# Patient Record
Sex: FEMALE | Race: WHITE | Employment: FULL TIME | URBAN - METROPOLITAN AREA
[De-identification: names, ages, dates, MRNs, and addresses within clinical notes are randomized per-mention and may not be internally consistent; named-entity substitution may affect disease eponyms.]

---

## 2018-04-18 PROCEDURE — 88175 CYTOPATH C/V AUTO FLUID REDO: CPT | Performed by: FAMILY MEDICINE

## 2018-04-18 PROCEDURE — 87624 HPV HI-RISK TYP POOLED RSLT: CPT | Performed by: FAMILY MEDICINE

## 2018-04-18 PROCEDURE — 87660 TRICHOMONAS VAGIN DIR PROBE: CPT | Performed by: FAMILY MEDICINE

## 2018-04-18 PROCEDURE — 87510 GARDNER VAG DNA DIR PROBE: CPT | Performed by: FAMILY MEDICINE

## 2018-04-18 PROCEDURE — 87480 CANDIDA DNA DIR PROBE: CPT | Performed by: FAMILY MEDICINE

## 2018-05-30 PROBLEM — L23.7 ALLERGIC DERMATITIS DUE TO POISON IVY: Status: ACTIVE | Noted: 2018-05-30

## 2019-05-06 ENCOUNTER — APPOINTMENT (OUTPATIENT)
Dept: ULTRASOUND IMAGING | Facility: HOSPITAL | Age: 49
End: 2019-05-06
Attending: EMERGENCY MEDICINE
Payer: COMMERCIAL

## 2019-05-06 ENCOUNTER — HOSPITAL ENCOUNTER (EMERGENCY)
Facility: HOSPITAL | Age: 49
Discharge: HOME | End: 2019-05-06
Attending: EMERGENCY MEDICINE
Payer: COMMERCIAL

## 2019-05-06 ENCOUNTER — TELEPHONE (OUTPATIENT)
Dept: OBGYN CLINIC | Facility: CLINIC | Age: 49
End: 2019-05-06

## 2019-05-06 VITALS
HEART RATE: 69 BPM | OXYGEN SATURATION: 99 % | TEMPERATURE: 98 F | WEIGHT: 160 LBS | HEIGHT: 66 IN | SYSTOLIC BLOOD PRESSURE: 126 MMHG | DIASTOLIC BLOOD PRESSURE: 81 MMHG | RESPIRATION RATE: 18 BRPM | BODY MASS INDEX: 25.71 KG/M2

## 2019-05-06 DIAGNOSIS — N93.8 DYSFUNCTIONAL UTERINE BLEEDING: Primary | ICD-10-CM

## 2019-05-06 PROCEDURE — 76856 US EXAM PELVIC COMPLETE: CPT | Performed by: EMERGENCY MEDICINE

## 2019-05-06 PROCEDURE — 80053 COMPREHEN METABOLIC PANEL: CPT | Performed by: EMERGENCY MEDICINE

## 2019-05-06 PROCEDURE — 36415 COLL VENOUS BLD VENIPUNCTURE: CPT | Performed by: EMERGENCY MEDICINE

## 2019-05-06 PROCEDURE — 99285 EMERGENCY DEPT VISIT HI MDM: CPT | Performed by: EMERGENCY MEDICINE

## 2019-05-06 PROCEDURE — 99284 EMERGENCY DEPT VISIT MOD MDM: CPT | Performed by: EMERGENCY MEDICINE

## 2019-05-06 PROCEDURE — 99285 EMERGENCY DEPT VISIT HI MDM: CPT

## 2019-05-06 PROCEDURE — 36415 COLL VENOUS BLD VENIPUNCTURE: CPT

## 2019-05-06 PROCEDURE — 93975 VASCULAR STUDY: CPT | Performed by: EMERGENCY MEDICINE

## 2019-05-06 PROCEDURE — 76830 TRANSVAGINAL US NON-OB: CPT | Performed by: EMERGENCY MEDICINE

## 2019-05-06 PROCEDURE — 85025 COMPLETE CBC W/AUTO DIFF WBC: CPT | Performed by: EMERGENCY MEDICINE

## 2019-05-06 PROCEDURE — 99284 EMERGENCY DEPT VISIT MOD MDM: CPT

## 2019-05-06 NOTE — ED PROVIDER NOTES
Patient Seen in: [facility] Emergency Department    History   Patient presents with:  Eval-G (genital)    Stated Complaint: heavy vagibal bleeding x 4 days    HPI    The patient is a 57-year-old very pleasant female who had not had a menstrual cycle Physical Exam    General: Well-appearing, nontoxic, well-developed, well-nourished adult female who is conversant, pleasant, comfortable and well appearing. Alert and oriented in no distress. Neuro: No focal neurologic deficits.  No facial droop or PLATELET.   Procedure                               Abnormality         Status                     ---------                               -----------         ------                     CBC W/ DIFFERENTIAL[[id number]]                              Final resul simple cyst.    LEFT OVARY:  The left ovary appears normal in size, shape, and     echogenicity. Follicles present. A small, 1.1 cm simple cystic     structures noted adjacent to the left ovary and may be periovarian.   The     left ovary is not visualize

## 2019-05-06 NOTE — TELEPHONE ENCOUNTER
Patient called for ER f/u visit. She states she has not had a menses for 6 months. She is not having heavy menses. Patient was referred to Dr. Briseida Abebe but he has no availability this week.  Patient scheduled with alternative MD. No further questions or concern

## 2019-05-06 NOTE — ED INITIAL ASSESSMENT (HPI)
Pt here for dysmenorrhea that started about a week ago. Pt notes using four tampons and pads today. Pt notes heavy cramping for the last 24hrs.

## 2019-05-13 PROCEDURE — 81001 URINALYSIS AUTO W/SCOPE: CPT | Performed by: FAMILY MEDICINE

## 2020-01-14 PROBLEM — F32.9 MAJOR DEPRESSION: Status: ACTIVE | Noted: 2020-01-14

## 2020-12-08 PROBLEM — F33.1 MODERATE RECURRENT MAJOR DEPRESSION (HCC): Status: ACTIVE | Noted: 2020-12-08

## 2020-12-08 PROBLEM — G47.00 INSOMNIA: Status: ACTIVE | Noted: 2020-12-08

## 2020-12-08 PROBLEM — F41.1 GENERALIZED ANXIETY DISORDER: Status: ACTIVE | Noted: 2020-12-08

## 2020-12-08 PROBLEM — F43.10 POSTTRAUMATIC STRESS DISORDER: Status: ACTIVE | Noted: 2020-12-08

## 2021-04-09 DIAGNOSIS — Z23 NEED FOR VACCINATION: ICD-10-CM

## 2021-06-21 ENCOUNTER — APPOINTMENT (OUTPATIENT)
Dept: OTHER | Facility: HOSPITAL | Age: 51
End: 2021-06-21
Attending: PREVENTIVE MEDICINE

## 2021-10-28 LAB
CYTOLOGY CVX/VAG DOC THIN PREP: NORMAL
HPV16+18+45 E6+E7MRNA CVX NAA+PROBE: NEGATIVE

## 2023-04-17 ENCOUNTER — OFFICE VISIT (OUTPATIENT)
Dept: OTHER | Facility: HOSPITAL | Age: 53
End: 2023-04-17
Attending: PREVENTIVE MEDICINE

## 2023-04-17 ENCOUNTER — HOSPITAL ENCOUNTER (OUTPATIENT)
Dept: GENERAL RADIOLOGY | Facility: HOSPITAL | Age: 53
Discharge: HOME | End: 2023-04-17
Attending: PREVENTIVE MEDICINE

## 2023-04-17 DIAGNOSIS — R76.12 POSITIVE QUANTIFERON-TB GOLD TEST: ICD-10-CM

## 2023-04-17 DIAGNOSIS — R76.12 POSITIVE QUANTIFERON-TB GOLD TEST: Primary | ICD-10-CM

## 2023-12-11 ENCOUNTER — HOSPITAL ENCOUNTER (OUTPATIENT)
Age: 53
Discharge: HOME | End: 2023-12-11

## 2023-12-11 VITALS
BODY MASS INDEX: 28.13 KG/M2 | SYSTOLIC BLOOD PRESSURE: 137 MMHG | HEART RATE: 84 BPM | RESPIRATION RATE: 16 BRPM | TEMPERATURE: 98 F | WEIGHT: 175 LBS | HEIGHT: 66 IN | OXYGEN SATURATION: 98 % | DIASTOLIC BLOOD PRESSURE: 89 MMHG

## 2023-12-11 DIAGNOSIS — N30.01 ACUTE CYSTITIS WITH HEMATURIA: Primary | ICD-10-CM

## 2023-12-11 LAB
GLUCOSE UR STRIP-MCNC: NEGATIVE MG/DL
KETONES UR STRIP-MCNC: NEGATIVE MG/DL
NITRITE UR QL STRIP: POSITIVE
PH UR STRIP: 6 [PH]
PROT UR STRIP-MCNC: >=300 MG/DL
SP GR UR STRIP: 1.02
UROBILINOGEN UR STRIP-ACNC: <2 MG/DL

## 2023-12-11 PROCEDURE — 81002 URINALYSIS NONAUTO W/O SCOPE: CPT | Performed by: NURSE PRACTITIONER

## 2023-12-11 PROCEDURE — 87086 URINE CULTURE/COLONY COUNT: CPT | Performed by: NURSE PRACTITIONER

## 2023-12-11 PROCEDURE — 87088 URINE BACTERIA CULTURE: CPT | Performed by: NURSE PRACTITIONER

## 2023-12-11 PROCEDURE — 87186 SC STD MICRODIL/AGAR DIL: CPT | Performed by: NURSE PRACTITIONER

## 2023-12-11 PROCEDURE — 99204 OFFICE O/P NEW MOD 45 MIN: CPT | Performed by: NURSE PRACTITIONER

## 2023-12-11 RX ORDER — CEPHALEXIN 500 MG/1
500 CAPSULE ORAL 2 TIMES DAILY
Qty: 14 CAPSULE | Refills: 0 | Status: SHIPPED | OUTPATIENT
Start: 2023-12-11 | End: 2023-12-18

## 2023-12-11 RX ORDER — PHENAZOPYRIDINE HYDROCHLORIDE 100 MG/1
100 TABLET, FILM COATED ORAL 3 TIMES DAILY PRN
Qty: 6 TABLET | Refills: 0 | Status: SHIPPED | OUTPATIENT
Start: 2023-12-11 | End: 2023-12-18

## 2024-11-08 ENCOUNTER — APPOINTMENT (OUTPATIENT)
Dept: FAMILY MEDICINE | Age: 54
End: 2024-11-08

## 2024-11-12 ENCOUNTER — APPOINTMENT (OUTPATIENT)
Dept: FAMILY MEDICINE | Age: 54
End: 2024-11-12

## 2024-11-12 VITALS
SYSTOLIC BLOOD PRESSURE: 120 MMHG | RESPIRATION RATE: 18 BRPM | HEIGHT: 66 IN | TEMPERATURE: 97.1 F | OXYGEN SATURATION: 98 % | WEIGHT: 173 LBS | BODY MASS INDEX: 27.8 KG/M2 | HEART RATE: 81 BPM | DIASTOLIC BLOOD PRESSURE: 78 MMHG

## 2024-11-12 DIAGNOSIS — F90.9 ATTENTION DEFICIT HYPERACTIVITY DISORDER (ADHD), UNSPECIFIED ADHD TYPE: ICD-10-CM

## 2024-11-12 DIAGNOSIS — Z80.8 FAMILY HISTORY OF SKIN CANCER: ICD-10-CM

## 2024-11-12 DIAGNOSIS — Z76.89 ENCOUNTER TO ESTABLISH CARE: Primary | ICD-10-CM

## 2024-11-12 DIAGNOSIS — F32.9 MAJOR DEPRESSIVE DISORDER, REMISSION STATUS UNSPECIFIED, UNSPECIFIED WHETHER RECURRENT: ICD-10-CM

## 2024-11-12 DIAGNOSIS — E55.9 VITAMIN D DEFICIENCY: ICD-10-CM

## 2024-11-12 DIAGNOSIS — Z51.81 MEDICATION MONITORING ENCOUNTER: ICD-10-CM

## 2024-11-12 DIAGNOSIS — H93.13 TINNITUS OF BOTH EARS: ICD-10-CM

## 2024-11-12 DIAGNOSIS — Z11.59 NEED FOR HEPATITIS C SCREENING TEST: ICD-10-CM

## 2024-11-12 DIAGNOSIS — S19.9XXD INJURY OF NECK, SUBSEQUENT ENCOUNTER: ICD-10-CM

## 2024-11-12 DIAGNOSIS — Z00.00 ANNUAL PHYSICAL EXAM: ICD-10-CM

## 2024-11-12 PROBLEM — F32.5 MAJOR DEPRESSIVE DISORDER WITH SINGLE EPISODE, IN FULL REMISSION (CMD): Status: RESOLVED | Noted: 2024-11-12 | Resolved: 2024-11-12

## 2024-11-12 PROBLEM — F41.1 GENERALIZED ANXIETY DISORDER: Status: ACTIVE | Noted: 2020-12-08

## 2024-11-12 PROBLEM — G47.00 INSOMNIA: Status: ACTIVE | Noted: 2020-12-08

## 2024-11-12 PROBLEM — F33.1 MODERATE RECURRENT MAJOR DEPRESSION (CMD): Status: RESOLVED | Noted: 2020-12-08 | Resolved: 2024-11-12

## 2024-11-12 PROBLEM — F32.5 MAJOR DEPRESSIVE DISORDER WITH SINGLE EPISODE, IN FULL REMISSION (CMD): Status: ACTIVE | Noted: 2024-11-12

## 2024-11-12 PROBLEM — S19.9XXA INJURY OF NECK: Status: ACTIVE | Noted: 2024-11-12

## 2024-11-12 PROBLEM — L23.7 ALLERGIC DERMATITIS DUE TO POISON IVY: Status: ACTIVE | Noted: 2018-05-30

## 2024-11-12 PROCEDURE — 99204 OFFICE O/P NEW MOD 45 MIN: CPT

## 2024-11-12 RX ORDER — DEXTROAMPHETAMINE SACCHARATE, AMPHETAMINE ASPARTATE, DEXTROAMPHETAMINE SULFATE AND AMPHETAMINE SULFATE 2.5; 2.5; 2.5; 2.5 MG/1; MG/1; MG/1; MG/1
TABLET ORAL
COMMUNITY
Start: 2024-10-31 | End: 2024-11-12 | Stop reason: ALTCHOICE

## 2024-11-12 RX ORDER — METHYLPHENIDATE HYDROCHLORIDE 10 MG/1
TABLET ORAL
COMMUNITY
Start: 2024-11-06

## 2024-11-12 RX ORDER — ESCITALOPRAM OXALATE 20 MG/1
TABLET ORAL
COMMUNITY
Start: 2024-11-06

## 2024-11-12 RX ORDER — ZOLPIDEM TARTRATE 10 MG/1
TABLET ORAL
COMMUNITY
Start: 2024-06-03 | End: 2024-11-12 | Stop reason: ALTCHOICE

## 2024-11-12 ASSESSMENT — PATIENT HEALTH QUESTIONNAIRE - PHQ9
CLINICAL INTERPRETATION OF PHQ2 SCORE: NO FURTHER SCREENING NEEDED
1. LITTLE INTEREST OR PLEASURE IN DOING THINGS: NOT AT ALL
SUM OF ALL RESPONSES TO PHQ9 QUESTIONS 1 AND 2: 0
2. FEELING DOWN, DEPRESSED OR HOPELESS: NOT AT ALL
SUM OF ALL RESPONSES TO PHQ9 QUESTIONS 1 AND 2: 0

## 2024-11-13 ENCOUNTER — IMAGING SERVICES (OUTPATIENT)
Dept: OTHER | Age: 54
End: 2024-11-13

## 2024-11-13 ENCOUNTER — TELEPHONE (OUTPATIENT)
Dept: FAMILY MEDICINE | Age: 54
End: 2024-11-13

## 2024-11-13 ENCOUNTER — OFFICE VISIT (OUTPATIENT)
Dept: PHYSICAL THERAPY | Age: 54
End: 2024-11-13

## 2024-11-13 DIAGNOSIS — S19.9XXD INJURY OF NECK, SUBSEQUENT ENCOUNTER: Primary | ICD-10-CM

## 2024-11-13 PROBLEM — S19.9XXA INJURY OF NECK: Status: ACTIVE | Noted: 2024-11-13

## 2024-11-13 ASSESSMENT — ENCOUNTER SYMPTOMS
QUALITY: PRESSURE
ALLEVIATING FACTORS: REST
PAIN SEVERITY NOW: 1
QUALITY: SHARP
QUALITY: DISCOMFORT
QUALITY: ACHE
ALLEVIATING FACTORS: PRESCRIPTION MEDICATIONS
PAIN DESCRIPTION: SEE NARRATIVE ABOVE FOR FURTHER INFORMATION
QUALITY: SORE
ALLEVIATING FACTORS: AVOIDING MOVEMENT IN INVOLVED AREA
SUBJECTIVE PAIN PROGRESSION: NO CHANGE
ALLEVIATING FACTORS: OVER-THE-COUNTER MEDICATION
ALLEVIATING FACTOR: SEE NARRATIVE ABOVE FOR FURTHER INFORMATION
PAIN SCALE AT HIGHEST: 6
PAIN FREQUENCY: INTERMITTENT
ALLEVIATING FACTORS: ICE
PAIN SCALE AT LOWEST: 1
ALLEVIATING FACTORS: CHANGE IN POSITION

## 2024-11-19 SDOH — ECONOMIC STABILITY: FOOD INSECURITY: WITHIN THE PAST 12 MONTHS, THE FOOD YOU BOUGHT JUST DIDN'T LAST AND YOU DIDN'T HAVE MONEY TO GET MORE.: NEVER TRUE

## 2024-11-19 SDOH — ECONOMIC STABILITY: TRANSPORTATION INSECURITY
IN THE PAST 12 MONTHS, HAS LACK OF RELIABLE TRANSPORTATION KEPT YOU FROM MEDICAL APPOINTMENTS, MEETINGS, WORK OR FROM GETTING THINGS NEEDED FOR DAILY LIVING?: NO

## 2024-11-19 SDOH — ECONOMIC STABILITY: HOUSING INSECURITY: WHAT IS YOUR LIVING SITUATION TODAY?: I HAVE A STEADY PLACE TO LIVE

## 2024-11-19 SDOH — ECONOMIC STABILITY: HOUSING INSECURITY: DO YOU HAVE PROBLEMS WITH ANY OF THE FOLLOWING?: NONE OF THE ABOVE

## 2024-11-19 SDOH — ECONOMIC STABILITY: GENERAL: WOULD YOU LIKE HELP WITH ANY OF THE FOLLOWING NEEDS?: I DON'T WANT HELP WITH ANY OF THESE

## 2024-11-19 ASSESSMENT — SOCIAL DETERMINANTS OF HEALTH (SDOH): IN THE PAST 12 MONTHS, HAS THE ELECTRIC, GAS, OIL, OR WATER COMPANY THREATENED TO SHUT OFF SERVICE IN YOUR HOME?: NO

## 2024-11-21 ENCOUNTER — APPOINTMENT (OUTPATIENT)
Dept: PHYSICAL THERAPY | Age: 54
End: 2024-11-21

## 2024-11-21 ENCOUNTER — LAB SERVICES (OUTPATIENT)
Dept: LAB | Age: 54
End: 2024-11-21

## 2024-11-21 DIAGNOSIS — S19.9XXD INJURY OF NECK, SUBSEQUENT ENCOUNTER: Primary | ICD-10-CM

## 2024-11-21 LAB
25(OH)D3+25(OH)D2 SERPL-MCNC: 45.6 NG/ML (ref 30–100)
ALBUMIN SERPL-MCNC: 4 G/DL (ref 3.4–5)
ALBUMIN/GLOB SERPL: 1.2 {RATIO} (ref 1–2.4)
ALP SERPL-CCNC: 99 UNITS/L (ref 45–117)
ALT SERPL-CCNC: 25 UNITS/L
ANION GAP SERPL CALC-SCNC: 18 MMOL/L (ref 7–19)
AST SERPL-CCNC: 21 UNITS/L
BILIRUB SERPL-MCNC: 0.5 MG/DL (ref 0.2–1)
BUN SERPL-MCNC: 19 MG/DL (ref 6–20)
BUN/CREAT SERPL: 21 (ref 7–25)
CALCIUM SERPL-MCNC: 9.5 MG/DL (ref 8.4–10.2)
CHLORIDE SERPL-SCNC: 105 MMOL/L (ref 97–110)
CHOLEST SERPL-MCNC: 253 MG/DL
CHOLEST/HDLC SERPL: 3.1 {RATIO}
CO2 SERPL-SCNC: 28 MMOL/L (ref 21–32)
CREAT SERPL-MCNC: 0.89 MG/DL (ref 0.51–0.95)
DEPRECATED RDW RBC: 43.9 FL (ref 39–50)
EGFRCR SERPLBLD CKD-EPI 2021: 76 ML/MIN/{1.73_M2}
ERYTHROCYTE [DISTWIDTH] IN BLOOD: 12.7 % (ref 11–15)
FASTING DURATION TIME PATIENT: 13 HOURS (ref 0–999)
GLOBULIN SER-MCNC: 3.4 G/DL (ref 2–4)
GLUCOSE SERPL-MCNC: 90 MG/DL (ref 70–99)
HBA1C MFR BLD: 5.8 % (ref 4.5–5.6)
HCT VFR BLD CALC: 42.3 % (ref 36–46.5)
HCV AB SER QL: NEGATIVE
HDLC SERPL-MCNC: 81 MG/DL
HGB BLD-MCNC: 14.1 G/DL (ref 12–15.5)
LDLC SERPL CALC-MCNC: 153 MG/DL
MAGNESIUM SERPL-MCNC: 2.2 MG/DL (ref 1.7–2.4)
MCH RBC QN AUTO: 31 PG (ref 26–34)
MCHC RBC AUTO-ENTMCNC: 33.3 G/DL (ref 32–36.5)
MCV RBC AUTO: 93 FL (ref 78–100)
NONHDLC SERPL-MCNC: 172 MG/DL
PLATELET # BLD AUTO: 314 K/MCL (ref 140–450)
POTASSIUM SERPL-SCNC: 5.1 MMOL/L (ref 3.4–5.1)
PROT SERPL-MCNC: 7.4 G/DL (ref 6.4–8.2)
RBC # BLD: 4.55 MIL/MCL (ref 4–5.2)
SODIUM SERPL-SCNC: 146 MMOL/L (ref 135–145)
TRIGL SERPL-MCNC: 97 MG/DL
TSH SERPL-ACNC: 1.44 MCUNITS/ML (ref 0.35–5)
WBC # BLD: 5.3 K/MCL (ref 4.2–11)

## 2024-11-21 PROCEDURE — 97530 THERAPEUTIC ACTIVITIES: CPT | Performed by: PHYSICAL THERAPIST

## 2024-11-21 PROCEDURE — 97110 THERAPEUTIC EXERCISES: CPT | Performed by: PHYSICAL THERAPIST

## 2024-11-21 PROCEDURE — 97112 NEUROMUSCULAR REEDUCATION: CPT | Performed by: PHYSICAL THERAPIST

## 2024-11-25 PROBLEM — E78.5 HYPERLIPIDEMIA: Status: ACTIVE | Noted: 2024-11-25

## 2024-11-25 PROBLEM — D12.6 TUBULAR ADENOMA OF COLON: Status: ACTIVE | Noted: 2024-11-25

## 2024-11-25 PROBLEM — E55.9 VITAMIN D DEFICIENCY: Status: RESOLVED | Noted: 2024-11-12 | Resolved: 2024-11-25

## 2024-11-25 PROBLEM — R73.03 PREDIABETES: Status: ACTIVE | Noted: 2024-11-25

## 2024-11-26 ENCOUNTER — APPOINTMENT (OUTPATIENT)
Dept: FAMILY MEDICINE | Age: 54
End: 2024-11-26

## 2024-11-26 VITALS
HEART RATE: 80 BPM | SYSTOLIC BLOOD PRESSURE: 120 MMHG | BODY MASS INDEX: 27.32 KG/M2 | HEIGHT: 66 IN | OXYGEN SATURATION: 97 % | WEIGHT: 170 LBS | TEMPERATURE: 97.8 F | RESPIRATION RATE: 18 BRPM | DIASTOLIC BLOOD PRESSURE: 82 MMHG

## 2024-11-26 DIAGNOSIS — Z12.31 ENCOUNTER FOR SCREENING MAMMOGRAM FOR MALIGNANT NEOPLASM OF BREAST: ICD-10-CM

## 2024-11-26 DIAGNOSIS — F32.9 MAJOR DEPRESSIVE DISORDER, REMISSION STATUS UNSPECIFIED, UNSPECIFIED WHETHER RECURRENT: ICD-10-CM

## 2024-11-26 DIAGNOSIS — S19.9XXD INJURY OF NECK, SUBSEQUENT ENCOUNTER: ICD-10-CM

## 2024-11-26 DIAGNOSIS — Z23 IMMUNIZATION DUE: ICD-10-CM

## 2024-11-26 DIAGNOSIS — H93.13 TINNITUS OF BOTH EARS: ICD-10-CM

## 2024-11-26 DIAGNOSIS — R73.03 PREDIABETES: ICD-10-CM

## 2024-11-26 DIAGNOSIS — Z00.00 ANNUAL PHYSICAL EXAM: Primary | ICD-10-CM

## 2024-11-26 DIAGNOSIS — Z80.8 FAMILY HISTORY OF SKIN CANCER: ICD-10-CM

## 2024-11-26 DIAGNOSIS — D12.6 TUBULAR ADENOMA OF COLON: ICD-10-CM

## 2024-11-26 DIAGNOSIS — F90.9 ATTENTION DEFICIT HYPERACTIVITY DISORDER (ADHD), UNSPECIFIED ADHD TYPE: ICD-10-CM

## 2024-11-26 DIAGNOSIS — E78.5 HYPERLIPIDEMIA, UNSPECIFIED HYPERLIPIDEMIA TYPE: ICD-10-CM

## 2024-11-26 RX ORDER — CYCLOBENZAPRINE HCL 5 MG
5 TABLET ORAL 3 TIMES DAILY PRN
Qty: 30 TABLET | Refills: 0 | Status: SHIPPED | OUTPATIENT
Start: 2024-11-26

## 2024-11-26 RX ORDER — NAPROXEN 250 MG/1
250 TABLET ORAL 2 TIMES DAILY PRN
Qty: 10 TABLET | Refills: 0 | Status: SHIPPED | OUTPATIENT
Start: 2024-11-26 | End: 2024-12-01

## 2024-11-26 ASSESSMENT — PATIENT HEALTH QUESTIONNAIRE - PHQ9
2. FEELING DOWN, DEPRESSED OR HOPELESS: NOT AT ALL
SUM OF ALL RESPONSES TO PHQ9 QUESTIONS 1 AND 2: 0
SUM OF ALL RESPONSES TO PHQ9 QUESTIONS 1 AND 2: 0
CLINICAL INTERPRETATION OF PHQ2 SCORE: NO FURTHER SCREENING NEEDED
1. LITTLE INTEREST OR PLEASURE IN DOING THINGS: NOT AT ALL

## 2024-12-03 ENCOUNTER — APPOINTMENT (OUTPATIENT)
Dept: DERMATOLOGY | Age: 54
End: 2024-12-03

## 2024-12-03 DIAGNOSIS — D22.9 MULTIPLE BENIGN MELANOCYTIC NEVI: ICD-10-CM

## 2024-12-03 DIAGNOSIS — L82.1 SK (SEBORRHEIC KERATOSIS): ICD-10-CM

## 2024-12-03 DIAGNOSIS — D18.01 CHERRY ANGIOMA: ICD-10-CM

## 2024-12-03 DIAGNOSIS — L81.4 LENTIGINES: ICD-10-CM

## 2024-12-03 DIAGNOSIS — Z12.83 SCREENING EXAM FOR SKIN CANCER: ICD-10-CM

## 2024-12-03 DIAGNOSIS — L57.0 ACTINIC KERATOSIS: Primary | ICD-10-CM

## 2024-12-03 PROCEDURE — 17000 DESTRUCT PREMALG LESION: CPT | Performed by: NURSE PRACTITIONER

## 2024-12-03 PROCEDURE — 17003 DESTRUCT PREMALG LES 2-14: CPT | Performed by: NURSE PRACTITIONER

## 2024-12-03 PROCEDURE — 99203 OFFICE O/P NEW LOW 30 MIN: CPT | Performed by: NURSE PRACTITIONER

## 2024-12-05 ENCOUNTER — TELEPHONE (OUTPATIENT)
Dept: PHYSICAL THERAPY | Age: 54
End: 2024-12-05

## 2024-12-05 ENCOUNTER — APPOINTMENT (OUTPATIENT)
Dept: PHYSICAL THERAPY | Age: 54
End: 2024-12-05

## 2024-12-05 DIAGNOSIS — S19.9XXD INJURY OF NECK, SUBSEQUENT ENCOUNTER: Primary | ICD-10-CM

## 2024-12-05 ASSESSMENT — ENCOUNTER SYMPTOMS
PAIN SCALE AT HIGHEST: 7
PAIN SCALE AT LOWEST: 1
PAIN SEVERITY NOW: 2

## 2024-12-27 ENCOUNTER — CLINICAL ABSTRACT (OUTPATIENT)
Dept: ENDOCRINOLOGY | Age: 54
End: 2024-12-27

## 2025-01-02 ENCOUNTER — APPOINTMENT (OUTPATIENT)
Dept: OTOLARYNGOLOGY | Age: 55
End: 2025-01-02

## 2025-01-02 ENCOUNTER — OFFICE VISIT (OUTPATIENT)
Dept: AUDIOLOGY | Age: 55
End: 2025-01-02
Attending: PHYSICIAN ASSISTANT

## 2025-01-02 VITALS — HEIGHT: 67 IN | BODY MASS INDEX: 26.68 KG/M2 | WEIGHT: 170 LBS

## 2025-01-02 DIAGNOSIS — H90.3 ASNHL (ASYMMETRICAL SENSORINEURAL HEARING LOSS): Primary | ICD-10-CM

## 2025-01-02 DIAGNOSIS — H93.13 SUBJECTIVE TINNITUS OF BOTH EARS: ICD-10-CM

## 2025-01-02 DIAGNOSIS — H93.13 TINNITUS OF BOTH EARS: Primary | ICD-10-CM

## 2025-01-02 DIAGNOSIS — H91.90 HEARING LOSS, UNSPECIFIED HEARING LOSS TYPE, UNSPECIFIED LATERALITY: ICD-10-CM

## 2025-01-02 PROCEDURE — 92556 SPEECH AUDIOMETRY COMPLETE: CPT | Performed by: AUDIOLOGIST

## 2025-01-02 PROCEDURE — 92567 TYMPANOMETRY: CPT | Performed by: AUDIOLOGIST

## 2025-01-02 PROCEDURE — 99203 OFFICE O/P NEW LOW 30 MIN: CPT | Performed by: PHYSICIAN ASSISTANT

## 2025-01-02 PROCEDURE — 92552 PURE TONE AUDIOMETRY AIR: CPT | Performed by: AUDIOLOGIST

## 2025-02-21 ENCOUNTER — APPOINTMENT (OUTPATIENT)
Dept: OTOLARYNGOLOGY | Age: 55
End: 2025-02-21

## 2025-03-09 ENCOUNTER — TELEPHONE (OUTPATIENT)
Dept: FAMILY MEDICINE | Age: 55
End: 2025-03-09

## 2025-03-28 ENCOUNTER — ORDER TRANSCRIPTION (OUTPATIENT)
Dept: PHYSICAL THERAPY | Age: 55
End: 2025-03-28

## 2025-03-28 ENCOUNTER — PATIENT MESSAGE (OUTPATIENT)
Dept: PHYSICAL THERAPY | Age: 55
End: 2025-03-28

## 2025-03-28 DIAGNOSIS — S82.392A CLOSED FRACTURE OF POSTERIOR MALLEOLUS, LEFT, INITIAL ENCOUNTER: Primary | ICD-10-CM

## 2025-04-01 ENCOUNTER — OFFICE VISIT (OUTPATIENT)
Dept: PHYSICAL THERAPY | Age: 55
End: 2025-04-01
Attending: ORTHOPAEDIC SURGERY
Payer: COMMERCIAL

## 2025-04-01 DIAGNOSIS — S82.392A CLOSED FRACTURE OF POSTERIOR MALLEOLUS, LEFT, INITIAL ENCOUNTER: Primary | ICD-10-CM

## 2025-04-01 PROCEDURE — 97110 THERAPEUTIC EXERCISES: CPT

## 2025-04-01 PROCEDURE — 97161 PT EVAL LOW COMPLEX 20 MIN: CPT

## 2025-04-01 PROCEDURE — 97140 MANUAL THERAPY 1/> REGIONS: CPT

## 2025-04-01 NOTE — PROGRESS NOTES
LOWER EXTREMITY EVALUATION:     Diagnosis:   Closed fracture of posterior malleolus, left, initial encounter (S82.019A) Patient:  Lola Araujo (54 year old, female)        Referring Provider: Yadiel Maria  Today's Date   4/1/2025    Precautions:  None   Date of Evaluation: 04/01/25  Next MD visit: 4/10/25  Date of Surgery: N/A     PATIENT SUMMARY   Summary of chief complaints: bilateral ankle pain, mobility, and strength limitations after left ankle fracture and right ankle sprain  History of current condition: The patient reports that she fell on the last step into her basement about four weeks ago. The patient went to the ER and saw an orthopedic and fractured her left ankle but also sprained her right ankle. She has been in a boot, [facility] for four weeks on her left side and has been using a lace-up ankle brace on her right side. She saw her doctor again last week and was told she can start WB this week. The patient has been doing some exercises for her left leg on her own, but has not yet done any exercises for her left foot/ankle. The patient reports that she walked down the street and around [employer] putting some weight through her left foot and tolerated it well. She reports that she felt fine afterwrad, but iced last night and did have some soreness and tightness this morning. She localizes pain over left medial distal tibia. She is supposed to return to work next week. She reports that her right ankle bothers her more than her left one.   Pain level: current 0 /10, at best 0 /10, at worst 2 /10  Description of symptoms: The patient describes a pinching pain over the distal tibia, denies any presthesias.   Occupation: x-ray/mammo technologist   Leisure activities/Hobbies: gardening/landscaping, getting on/off a boat   Prior level of function: The patient was able to complete all ADL's and occupational activities without limitations. The patient has started doing some upper body workourts while she's been off of  work, she has been doing some basic LE strengthening exercises as well.  Current limitations: walking without AD and boot, prolonged stanidng, walking long distances, stairs, navigating uneven surfaces  Pt goals: return to normal  Past medical history was reviewed with Lola.  Significant findings include: previous neck pain  Imaging/Tests: x-ray   Lola  has a past medical history of Colon polyp (08/28/2018) and S/P colonoscopy (08/28/2018).  She  has a past surgical history that includes skin surgery (Left, 03/19/2015).    ASSESSMENT  Lola presents to physical therapy evaluation with primary c/o bilateral ankle pain, mobility, and strength limitations after left ankle fracture and right ankle sprain. The results of the objective tests and measures show anticipated deficits in left ankle mobility after left ankle fracture and immobilization with CAM walking boot with secondary right ankle sprain. The patient is with some remaining swelling in her left foot and ankle and is with mobility limitations at the talocrural joint bilaterally that limit her DF mobility. The patient works as an x-ray/mammo technologist that does require prolonged standing, walking long distances, and some light patient management. The patient would like to resume gardening and landscaping in her yard. We tried walking with one crutch which the patient tolerated decently. The patient was instructed in gradual progression in WB and weaning from the crutches and anticipated increase in soreness and possibly swelling with any increase in activity. Functional deficits include but are not limited to walking without AD and boot, prolonged stanidng, walking long distances, stairs, navigating uneven surfaces. Signs and symptoms are consistent with diagnosis of Closed fracture of posterior malleolus, left, initial encounter (S82.044A). Pt and PT discussed evaluation findings, pathology, POC and HEP.  Pt voiced understanding and performs HEP correctly  without reported pain. Skilled Physical Therapy is medically necessary to address the above impairments and reach functional goals.    OBJECTIVE:    Musculoskeletal  Observation: The patient is with observable swelling in her left lower leg and ankle. Mild bruising between the right second and third toes.  Palpation: Patient denies TTP over the left malleolus or right lateral ankle ligaments.   Accessory Motion: Patient is with mobility deficits bilaterally at the talocrural joint line.     Edema: Figure-8 circumference: (R) 48.3cm, (L) 49.4cm     ROM and Strength: (* denotes performed with pain)  Ankle/Foot   ROM MMT (-/5)    R L R L     PF N/A N/A N/A N/A     DF (L4) 12 5 N/A N/A   Strength was not formally assessed due to acuity.     Balance and Functional Mobility:  Gait: pt ambulates on level ground with assistive device; decreased step length; decreased stance phase      Today's Treatment and Response:   Pt education was provided on exam findings, treatment diagnosis, treatment plan, expectations, and prognosis.  Today's Treatment       4/1/2025   LE Treatment   Therapeutic Exercise Ankle PROM: DF/PF x 10 (L)   Ankle ABC: x 1 (L)   4-way ankle theraband: x 20 (R), red theraband  Seated calf raise: x 20 (B)   Seated ankle DF: x 20 (B)   Gait training: walking with one crutch    Manual Therapy Soft tissue mobilization: (B) gastroc, soleus, peroneals, tib anterior  Edema massage x 3min to both feet/ankles  Posterior talocrural joint mobs: Grade 3, 4 x 10\" (R)   Posterior talocrural joint mobs with distraction: Grade 3, 4 x 10\" (L) - lighter pressure   Therapeutic Exercise Minutes 15   Manual Therapy Minutes 15   Evaluation Minutes 15   Total Time Of Timed Procedures 30   Total Time Of Service-Based Procedures 15   Total Treatment Time 45   HEP Access Code: [email or web address]  URL: [email or web address]  Date: 04/01/2025  Prepared by: Vane Wade    Exercises  - Seated Ankle Alphabet  - 1 x daily - 7  x weekly - 1 sets - 1 reps  - Long Sitting Calf Stretch with Strap  - 1 x daily - 7 x weekly - 1 sets - 2 reps - 30\"  hold  - Seated Toe Raise  - 1 x daily - 7 x weekly - 2 sets - 10 reps  - Seated Heel Raise  - 1 x daily - 7 x weekly - 2 sets - 10 reps  - Long Sitting Ankle Eversion with Resistance  - 1 x daily - 7 x weekly - 2 sets - 10 reps - red theraband  - Long Sitting Ankle Plantar Flexion with Resistance  - 1 x daily - 7 x weekly - 2 sets - 10 reps - red theraband  - Long Sitting Ankle Inversion with Resistance  - 1 x daily - 7 x weekly - 2 sets - 10 reps - red theraband  - Long Sitting Ankle Dorsiflexion with Anchored Resistance  - 1 x daily - 7 x weekly - 2 sets - 10 reps - red theraband        Patient was instructed in and issued a HEP for: Access Code: [email or web address]  URL: [email or web address]  Date: 04/01/2025  Prepared by: Vane Wade    Exercises  - Seated Ankle Alphabet  - 1 x daily - 7 x weekly - 1 sets - 1 reps  - Long Sitting Calf Stretch with Strap  - 1 x daily - 7 x weekly - 1 sets - 2 reps - 30\"  hold  - Seated Toe Raise  - 1 x daily - 7 x weekly - 2 sets - 10 reps  - Seated Heel Raise  - 1 x daily - 7 x weekly - 2 sets - 10 reps  - Long Sitting Ankle Eversion with Resistance  - 1 x daily - 7 x weekly - 2 sets - 10 reps - red theraband  - Long Sitting Ankle Plantar Flexion with Resistance  - 1 x daily - 7 x weekly - 2 sets - 10 reps - red theraband  - Long Sitting Ankle Inversion with Resistance  - 1 x daily - 7 x weekly - 2 sets - 10 reps - red theraband  - Long Sitting Ankle Dorsiflexion with Anchored Resistance  - 1 x daily - 7 x weekly - 2 sets - 10 reps - red theraband    Charges:  PT EVAL: Low Complexity, MT x 1, TE x 1  In agreement with evaluation findings and clinical rationale, this evaluation involved LOW COMPLEXITY decision making due to no personal factors/comorbidities, 1-2 body structures involved/activity limitations, and stable symptoms as documented in the  evaluation.                                                                                                                PLAN OF CARE:    Goals: (to be met in 16 visits)   Short-Term Goals:  The patient will discontinue use of AD for daily ambulation. Timeframe: 4 visits.   Patient will normalize ankle CKC DF mobility on his/her ankle, equal to contralateral side, to facilitate mobility requirement for normalized gait pattern and stair navigation and normalized running gait pattern. Timeframe: 4-6 visits.  Patient will improve ankle mobility, strength, and endurance to facilitate ability to stand for 2 hour(s) consecutively for community integration and occupational demands. Timeframe: 6 visits.  The patient will discontinue use of CAM walking boot for daily ambulation. Timeframe: 8 visits.   Long-Term Goals:  Patient will improve ankle mobility, strength, and endurance to facilitate walking distances of 2 mile(s) daily for household and community ambulation. Timeframe: 10-12 visits.  Patient will improve ankle mobility and strength for reciprocal stair navigation pattern with no asymmetries for ascending and descending 5 flights of stairs daily for household and community ambulation. Timeframe: 12 visits.  Patient will improve foot/ankle stability and balance for navigating uneven or unsteady terrain for community integration and yardwork. Timeframe: 16 visits.   Patient will improve LEFS score by at least 9 points to indicate a true change in improved function for ADL's and restoring PLF. Timeframe: 16 visits.       Frequency / Duration: Patient will be seen 2x/week or a total of 16 visits over a 90 day period. Treatment will include: Manual Therapy; Gait training; Neuromuscular Re-education; Self-Care Home Management; Therapeutic Activities; Therapeutic Exercise; Home Exercise Program instruction    Education or treatment limitation: None   Rehab Potential: excellent     LEFS Score  LEFS Score: (Patient-Rptd)  28.75 % (3/29/2025  2:08 PM)    Patient/Family/Caregiver was advised of these findings, precautions, and treatment options and has agreed to actively participate in planning and for this course of care.    Thank you for your referral. Please co-sign or sign and return this letter via fax as soon as possible to [phone]. If you have any questions, please contact me at Dept: [phone]    Sincerely,  Electronically signed by therapist: Vane Wade PT  Physician's certification required: Yes  I certify the need for these services furnished under this plan of treatment and while under my care.    X___________________________________________________ Date____________________    Certification From: 4/1/2025  To:6/30/2025

## 2025-04-03 ENCOUNTER — APPOINTMENT (OUTPATIENT)
Dept: PHYSICAL THERAPY | Age: 55
End: 2025-04-03

## 2025-04-03 ENCOUNTER — OFFICE VISIT (OUTPATIENT)
Dept: PHYSICAL THERAPY | Age: 55
End: 2025-04-03
Attending: ORTHOPAEDIC SURGERY
Payer: COMMERCIAL

## 2025-04-03 PROCEDURE — 97140 MANUAL THERAPY 1/> REGIONS: CPT

## 2025-04-03 NOTE — PROGRESS NOTES
Patient: Lola Araujo (54 year old, female) Referring Provider:  Insurance:   Diagnosis: Closed fracture of posterior malleolus, left, initial encounter (C87.088P) Yadiel Maria  [facility] [place] PPO   Date of Surgery: N/A Next MD visit:  N/A   Precautions:  None 4/10/25 Referral Information:    Date of Evaluation: Req: 0, Auth: 0, Exp:     04/01/25 POC Auth Visits:          Today's Date   4/3/2025    Subjective  The patient reports that she is doing better today, but she was feeling \"lop-sided\" yesterday. The patient reports that reports \"a little pain\" when she tries to walk without the crutch. The patient reports compliance with her HEP.       Pain: 0/10     Objective          Assessment  The patient reports that she has been walking with one crutch most of the time and has tried walking some short distances without her crutch, but did have some mild pain with it. The patient is still with limited talocrural joint mobility that we continue to address. The patient, unfortunately, had to end the session early as she wasn't feeling well and requested to go home. We will continue to progress WB tolerance and weaning from AD use as tolerated.    Goals (to be met in 16 visits)   Short-Term Goals:  The patient will discontinue use of AD for daily ambulation. Timeframe: 4 visits.   Patient will normalize ankle CKC DF mobility on his/her ankle, equal to contralateral side, to facilitate mobility requirement for normalized gait pattern and stair navigation and normalized running gait pattern. Timeframe: 4-6 visits.  Patient will improve ankle mobility, strength, and endurance to facilitate ability to stand for 2 hour(s) consecutively for community integration and occupational demands. Timeframe: 6 visits.  The patient will discontinue use of CAM walking boot for daily ambulation. Timeframe: 8 visits.   Long-Term Goals:  Patient will improve ankle mobility, strength, and endurance to facilitate walking distances of 2 mile(s) daily  for household and community ambulation. Timeframe: 10-12 visits.  Patient will improve ankle mobility and strength for reciprocal stair navigation pattern with no asymmetries for ascending and descending 5 flights of stairs daily for household and community ambulation. Timeframe: 12 visits.  Patient will improve foot/ankle stability and balance for navigating uneven or unsteady terrain for community integration and yardwork. Timeframe: 16 visits.   Patient will improve LEFS score by at least 9 points to indicate a true change in improved function for ADL's and restoring PLF. Timeframe: 16 visits.           Plan  Follow-up on tolerance to WB and weaning from crutches.    Treatment Last 4 Visits  Treatment Day: 2       4/1/2025 4/3/2025   LE Treatment   Therapeutic Exercise Ankle PROM: DF/PF x 10 (L)   Ankle ABC: x 1 (L)   4-way ankle theraband: x 20 (R), red theraband  Seated calf raise: x 20 (B)   Seated ankle DF: x 20 (B)   Gait training: walking with one crutch     Manual Therapy Soft tissue mobilization: (B) gastroc, soleus, peroneals, tib anterior  Edema massage x 3min to both feet/ankles  Posterior talocrural joint mobs: Grade 3, 4 x 10\" (R)   Posterior talocrural joint mobs with distraction: Grade 3, 4 x 10\" (L) - lighter pressure Soft tissue mobilization: (B) gastroc, soleus, peroneals, tib anterior  Edema massage x 3min to both feet/ankles  Posterior talocrural joint mobs: Grade 3, 4 x 10\" (R)   Posterior talocrural joint mobs with distraction: Grade 3, 4 x 10\" (L) - lighter pressure   Therapeutic Exercise Minutes 15    Manual Therapy Minutes 15 15   Evaluation Minutes 15    Total Time Of Timed Procedures 30 15   Total Time Of Service-Based Procedures 15 0   Total Treatment Time 45 15   HEP Access Code: [email or web address]  URL: [email or web address]  Date: 04/01/2025  Prepared by: Vane Wade    Exercises  - Seated Ankle Alphabet  - 1 x daily - 7 x weekly - 1 sets - 1 reps  - Long Sitting Calf  Stretch with Strap  - 1 x daily - 7 x weekly - 1 sets - 2 reps - 30\"  hold  - Seated Toe Raise  - 1 x daily - 7 x weekly - 2 sets - 10 reps  - Seated Heel Raise  - 1 x daily - 7 x weekly - 2 sets - 10 reps  - Long Sitting Ankle Eversion with Resistance  - 1 x daily - 7 x weekly - 2 sets - 10 reps - red theraband  - Long Sitting Ankle Plantar Flexion with Resistance  - 1 x daily - 7 x weekly - 2 sets - 10 reps - red theraband  - Long Sitting Ankle Inversion with Resistance  - 1 x daily - 7 x weekly - 2 sets - 10 reps - red theraband  - Long Sitting Ankle Dorsiflexion with Anchored Resistance  - 1 x daily - 7 x weekly - 2 sets - 10 reps - red theraband         HEP  Access Code: [email or web address]  URL: [email or web address]  Date: 04/01/2025  Prepared by: Vane Wade    Exercises  - Seated Ankle Alphabet  - 1 x daily - 7 x weekly - 1 sets - 1 reps  - Long Sitting Calf Stretch with Strap  - 1 x daily - 7 x weekly - 1 sets - 2 reps - 30\"  hold  - Seated Toe Raise  - 1 x daily - 7 x weekly - 2 sets - 10 reps  - Seated Heel Raise  - 1 x daily - 7 x weekly - 2 sets - 10 reps  - Long Sitting Ankle Eversion with Resistance  - 1 x daily - 7 x weekly - 2 sets - 10 reps - red theraband  - Long Sitting Ankle Plantar Flexion with Resistance  - 1 x daily - 7 x weekly - 2 sets - 10 reps - red theraband  - Long Sitting Ankle Inversion with Resistance  - 1 x daily - 7 x weekly - 2 sets - 10 reps - red theraband  - Long Sitting Ankle Dorsiflexion with Anchored Resistance  - 1 x daily - 7 x weekly - 2 sets - 10 reps - red theraband    Charges     MT x 1

## 2025-04-09 ENCOUNTER — OFFICE VISIT (OUTPATIENT)
Dept: PHYSICAL THERAPY | Age: 55
End: 2025-04-09
Attending: ORTHOPAEDIC SURGERY
Payer: COMMERCIAL

## 2025-04-09 PROCEDURE — 97110 THERAPEUTIC EXERCISES: CPT

## 2025-04-09 PROCEDURE — 97140 MANUAL THERAPY 1/> REGIONS: CPT

## 2025-04-09 NOTE — PROGRESS NOTES
Patient: Lola Araujo (54 year old, female) Referring Provider:  Insurance:   Diagnosis: Closed fracture of posterior malleolus, left, initial encounter (S82.597A) Yadiel Maria  [employer] [place]   Date of Surgery: N/A Next MD visit:  N/A   Precautions:  None 4/10/25 Referral Information:    Date of Evaluation: Req: 0, Auth: 0, Exp:     04/01/25 POC Auth Visits:          Today's Date   4/9/2025    Subjective  The patient reports that she has been tolerating returning to work. She hasn't been using the brace on her right foot, \"it has it's moments.\" She reports that most of the pain was posterior over the achilles on the left side. She reports that sometimes the rubbing of the boot causes some pain over the ankle bone. She sees her doctor tomorrow and will get clarification on instructions and timeline for weaning from the boot.       Pain: 0/10     Objective  Pain at worst: 3/10       Ankle/Foot       4/1/2025 4/9/2025   Ankle/Foot ROM/MMT   Rt Foot/Ank Df 12 39 (CKC)   Lt Foot/Ank Df 5 22 (CKC)         Assessment  Lola arrives today after returning to work this week, which she has been tolerated well. She is icing her ankle over her lunch hour. The patient is still with some remaining ankle DF mobility deficits at the TC joint that we continue to address in session. We progressed some light WB activities as well MD ankle mobility exercises this date which she tolerated well. We updated the patient's HEP to reflect recent progressions in her exercise regimen. The patient would benefit from continued PT to normalize ankle DF mobility and facilitate weaning from the boot with MD approval.    Goals (to be met in 16 visits)   Short-Term Goals:  The patient will discontinue use of AD for daily ambulation. Timeframe: 4 visits. (MET 4/9/25)  Patient will normalize ankle CKC DF mobility on his/her ankle, equal to contralateral side, to facilitate mobility requirement for normalized gait pattern and stair navigation and  normalized running gait pattern. Timeframe: 4-6 visits. (IN PROGRESS 4/9/25.)  Patient will improve ankle mobility, strength, and endurance to facilitate ability to stand for 2 hour(s) consecutively for community integration and occupational demands. Timeframe: 6 visits.  The patient will discontinue use of CAM walking boot for daily ambulation. Timeframe: 8 visits.   Long-Term Goals:  Patient will improve ankle mobility, strength, and endurance to facilitate walking distances of 2 mile(s) daily for household and community ambulation. Timeframe: 10-12 visits.  Patient will improve ankle mobility and strength for reciprocal stair navigation pattern with no asymmetries for ascending and descending 5 flights of stairs daily for household and community ambulation. Timeframe: 12 visits.  Patient will improve foot/ankle stability and balance for navigating uneven or unsteady terrain for community integration and yardwork. Timeframe: 16 visits.   Patient will improve LEFS score by at least 9 points to indicate a true change in improved function for ADL's and restoring PLF. Timeframe: 16 visits.       Plan  Follow up on tolerance to updated HEP and MD instructions for weaning from the boot.    Treatment Last 4 Visits  Treatment Day: 3       4/1/2025 4/3/2025 4/9/2025   LE Treatment   Therapeutic Exercise Ankle PROM: DF/PF x 10 (L)   Ankle ABC: x 1 (L)   4-way ankle theraband: x 20 (R), red theraband  Seated calf raise: x 20 (B)   Seated ankle DF: x 20 (B)   Gait training: walking with one crutch   Ankle PROM: DF/PF x 10 (L)   Seated calf raise: x 20 (B)   Seated ankle DF: x 20 (B)   Gastroc stretch: x 30\" (B)   Soleus stretch: attempted but d/c due to pain   DLHR: x 20, limited AROM   Neuro Re-Education   Tandem balance: 2 x 30\" (B)  BAPS board circles: x 10 CW/CCW (L), right foot on graound    Manual Therapy Soft tissue mobilization: (B) gastroc, soleus, peroneals, tib anterior  Edema massage x 3min to both  feet/ankles  Posterior talocrural joint mobs: Grade 3, 4 x 10\" (R)   Posterior talocrural joint mobs with distraction: Grade 3, 4 x 10\" (L) - lighter pressure Soft tissue mobilization: (B) gastroc, soleus, peroneals, tib anterior  Edema massage x 3min to both feet/ankles  Posterior talocrural joint mobs: Grade 3, 4 x 10\" (R)   Posterior talocrural joint mobs with distraction: Grade 3, 4 x 10\" (L) - lighter pressure Soft tissue mobilization: (B) gastroc, soleus, peroneals, tib anterior   Posterior talocrural joint mobs: Grade 3, 4 x 10\" (R)   Posterior talocrural joint mobs with distraction: Grade 3, 4 x 10\" (L)   CKC ankle DF mob: 2 x 8 (L)    Therapeutic Exercise Minutes 15  20   Neuro Re-Educ Minutes   3   Manual Therapy Minutes 15 15 17   Evaluation Minutes 15     Total Time Of Timed Procedures 30 15 40   Total Time Of Service-Based Procedures 15 0 0   Total Treatment Time 45 15 40   HEP Access Code: [email or web address]  URL: [email or web address]  Date: 04/01/2025  Prepared by: Vane Wade    Exercises  - Seated Ankle Alphabet  - 1 x daily - 7 x weekly - 1 sets - 1 reps  - Long Sitting Calf Stretch with Strap  - 1 x daily - 7 x weekly - 1 sets - 2 reps - 30\"  hold  - Seated Toe Raise  - 1 x daily - 7 x weekly - 2 sets - 10 reps  - Seated Heel Raise  - 1 x daily - 7 x weekly - 2 sets - 10 reps  - Long Sitting Ankle Eversion with Resistance  - 1 x daily - 7 x weekly - 2 sets - 10 reps - red theraband  - Long Sitting Ankle Plantar Flexion with Resistance  - 1 x daily - 7 x weekly - 2 sets - 10 reps - red theraband  - Long Sitting Ankle Inversion with Resistance  - 1 x daily - 7 x weekly - 2 sets - 10 reps - red theraband  - Long Sitting Ankle Dorsiflexion with Anchored Resistance  - 1 x daily - 7 x weekly - 2 sets - 10 reps - red theraband          HEP  Access Code: [email or web address]  URL: [email or web address]  Date: 04/01/2025  Prepared by: Vane Wade    Exercises  - Seated Ankle  Alphabet  - 1 x daily - 7 x weekly - 1 sets - 1 reps  - Long Sitting Calf Stretch with Strap  - 1 x daily - 7 x weekly - 1 sets - 2 reps - 30\"  hold  - Seated Toe Raise  - 1 x daily - 7 x weekly - 2 sets - 10 reps  - Seated Heel Raise  - 1 x daily - 7 x weekly - 2 sets - 10 reps  - Long Sitting Ankle Eversion with Resistance  - 1 x daily - 7 x weekly - 2 sets - 10 reps - red theraband  - Long Sitting Ankle Plantar Flexion with Resistance  - 1 x daily - 7 x weekly - 2 sets - 10 reps - red theraband  - Long Sitting Ankle Inversion with Resistance  - 1 x daily - 7 x weekly - 2 sets - 10 reps - red theraband  - Long Sitting Ankle Dorsiflexion with Anchored Resistance  - 1 x daily - 7 x weekly - 2 sets - 10 reps - red theraband    Charges     MT x 1, TE x 2

## 2025-04-15 ENCOUNTER — TELEPHONE (OUTPATIENT)
Dept: PHYSICAL THERAPY | Facility: HOSPITAL | Age: 55
End: 2025-04-15

## 2025-04-17 ENCOUNTER — OFFICE VISIT (OUTPATIENT)
Dept: PHYSICAL THERAPY | Age: 55
End: 2025-04-17
Attending: ORTHOPAEDIC SURGERY
Payer: COMMERCIAL

## 2025-04-17 ENCOUNTER — APPOINTMENT (OUTPATIENT)
Dept: PHYSICAL THERAPY | Age: 55
End: 2025-04-17
Attending: ORTHOPAEDIC SURGERY
Payer: COMMERCIAL

## 2025-04-17 PROCEDURE — 97112 NEUROMUSCULAR REEDUCATION: CPT

## 2025-04-17 PROCEDURE — 97140 MANUAL THERAPY 1/> REGIONS: CPT

## 2025-04-17 PROCEDURE — 97110 THERAPEUTIC EXERCISES: CPT

## 2025-04-17 NOTE — PROGRESS NOTES
Patient: Lola Araujo (55 year old, female) Referring Provider:  Insurance:   Diagnosis: Closed fracture of posterior malleolus, left, initial encounter (S87.521X) Yadiel Maria  [facility] [place] PPO   Date of Surgery: N/A Next MD visit:  N/A   Precautions:  None 4/10/25 Referral Information:    Date of Evaluation: Req: 0, Auth: 0, Exp:     04/01/25 POC Auth Visits:          Today's Date   4/17/2025    Subjective  The patient reports \"there's moments where it's stiff. I think it's stiff today because of the weather. It's a bit sore at the end of the work day, but that's normal.\"       Pain: 0/10     Objective          Assessment  Lola arrives after follow-up appt with her MD who gave her permission to being weaning from the sling. She has been tolerating standing and walking at work with the boot on her left and without the brace on her right foot decently. She admits that she is with occasional instances of pain in both ankles, but short-lived and temporary. She is still with some deficits in CKC ankle DF on her left side compared to her right that we continue to address. The patient shows good improvements in ankle stability/balance for balance exercises. We will continue to progress ankle mobility, strength, and stability for full return to PLF.    Goals (to be met in 16 visits)   Short-Term Goals:  The patient will discontinue use of AD for daily ambulation. Timeframe: 4 visits. (MET 4/9/25)  Patient will normalize ankle CKC DF mobility on his/her ankle, equal to contralateral side, to facilitate mobility requirement for normalized gait pattern and stair navigation and normalized running gait pattern. Timeframe: 4-6 visits. (IN PROGRESS 4/9/25.)  Patient will improve ankle mobility, strength, and endurance to facilitate ability to stand for 2 hour(s) consecutively for community integration and occupational demands. Timeframe: 6 visits.  The patient will discontinue use of CAM walking boot for daily ambulation.  Timeframe: 8 visits.   Long-Term Goals:  Patient will improve ankle mobility, strength, and endurance to facilitate walking distances of 2 mile(s) daily for household and community ambulation. Timeframe: 10-12 visits.  Patient will improve ankle mobility and strength for reciprocal stair navigation pattern with no asymmetries for ascending and descending 5 flights of stairs daily for household and community ambulation. Timeframe: 12 visits.  Patient will improve foot/ankle stability and balance for navigating uneven or unsteady terrain for community integration and yardwork. Timeframe: 16 visits.   Patient will improve LEFS score by at least 9 points to indicate a true change in improved function for ADL's and restoring PLF. Timeframe: 16 visits.           Plan  Continue to address ankle DF mobility deficits and progress ankle strength and stability/balance.    Treatment Last 4 Visits  Treatment Day: 4       4/1/2025 4/3/2025 4/9/2025 4/17/2025   LE Treatment   Therapeutic Exercise Ankle PROM: DF/PF x 10 (L)   Ankle ABC: x 1 (L)   4-way ankle theraband: x 20 (R), red theraband  Seated calf raise: x 20 (B)   Seated ankle DF: x 20 (B)   Gait training: walking with one crutch   Ankle PROM: DF/PF x 10 (L)   Seated calf raise: x 20 (B)   Seated ankle DF: x 20 (B)   Gastroc stretch: x 30\" (B)   Soleus stretch: attempted but d/c due to pain   DLHR: x 20, limited AROM Ankle PROM: DF/PF x 10 (L)   Gastroc stretch: 2 x 30\" (B)   DLHR: x 20, limited AROM    Neuro Re-Education   Tandem balance: 2 x 30\" (B)  BAPS board circles: x 10 CW/CCW (L), right foot on graound  Tandem balance: 2 x 30\" (B)   Tandem balance on airex: x 30\" (B)   Ankle wobble/balance board: A/P, M/L taps x 20, balance 2 x 20\"   BAPS board circles: x 10 CW/CCW (L), right foot on ground    Manual Therapy Soft tissue mobilization: (B) gastroc, soleus, peroneals, tib anterior  Edema massage x 3min to both feet/ankles  Posterior talocrural joint mobs: Grade 3, 4 x  10\" (R)   Posterior talocrural joint mobs with distraction: Grade 3, 4 x 10\" (L) - lighter pressure Soft tissue mobilization: (B) gastroc, soleus, peroneals, tib anterior  Edema massage x 3min to both feet/ankles  Posterior talocrural joint mobs: Grade 3, 4 x 10\" (R)   Posterior talocrural joint mobs with distraction: Grade 3, 4 x 10\" (L) - lighter pressure Soft tissue mobilization: (B) gastroc, soleus, peroneals, tib anterior   Posterior talocrural joint mobs: Grade 3, 4 x 10\" (R)   Posterior talocrural joint mobs with distraction: Grade 3, 4 x 10\" (L)   CKC ankle DF mob: 2 x 8 (L)  Soft tissue mobilization: (B) gastroc, soleus, peroneals, tib anterior   Posterior talocrural joint mobs: Grade 3, 4 x 10\" (R)   Posterior talocrural joint mobs with distraction: Grade 3, 4 x 10\" (L)   CKC ankle DF mob: 2 x 8 (L)    Therapeutic Exercise Minutes 15  20 8   Neuro Re-Educ Minutes   3 18   Manual Therapy Minutes 15 15 17 17   Evaluation Minutes 15      Total Time Of Timed Procedures 30 15 40 43   Total Time Of Service-Based Procedures 15 0 0 0   Total Treatment Time 45 15 40 43   HEP Access Code: [email or web address]  URL: [email or web address]  Date: 04/01/2025  Prepared by: Vane Wade    Exercises  - Seated Ankle Alphabet  - 1 x daily - 7 x weekly - 1 sets - 1 reps  - Long Sitting Calf Stretch with Strap  - 1 x daily - 7 x weekly - 1 sets - 2 reps - 30\"  hold  - Seated Toe Raise  - 1 x daily - 7 x weekly - 2 sets - 10 reps  - Seated Heel Raise  - 1 x daily - 7 x weekly - 2 sets - 10 reps  - Long Sitting Ankle Eversion with Resistance  - 1 x daily - 7 x weekly - 2 sets - 10 reps - red theraband  - Long Sitting Ankle Plantar Flexion with Resistance  - 1 x daily - 7 x weekly - 2 sets - 10 reps - red theraband  - Long Sitting Ankle Inversion with Resistance  - 1 x daily - 7 x weekly - 2 sets - 10 reps - red theraband  - Long Sitting Ankle Dorsiflexion with Anchored Resistance  - 1 x daily - 7 x weekly - 2 sets -  10 reps - red theraband           HEP  Access Code: [email or web address]  URL: [email or web address]  Date: 04/01/2025  Prepared by: Vane Wade    Exercises  - Seated Ankle Alphabet  - 1 x daily - 7 x weekly - 1 sets - 1 reps  - Long Sitting Calf Stretch with Strap  - 1 x daily - 7 x weekly - 1 sets - 2 reps - 30\"  hold  - Seated Toe Raise  - 1 x daily - 7 x weekly - 2 sets - 10 reps  - Seated Heel Raise  - 1 x daily - 7 x weekly - 2 sets - 10 reps  - Long Sitting Ankle Eversion with Resistance  - 1 x daily - 7 x weekly - 2 sets - 10 reps - red theraband  - Long Sitting Ankle Plantar Flexion with Resistance  - 1 x daily - 7 x weekly - 2 sets - 10 reps - red theraband  - Long Sitting Ankle Inversion with Resistance  - 1 x daily - 7 x weekly - 2 sets - 10 reps - red theraband  - Long Sitting Ankle Dorsiflexion with Anchored Resistance  - 1 x daily - 7 x weekly - 2 sets - 10 reps - red theraband    Charges     MT x 1, NMR x 1, TE x 1

## 2025-04-22 ENCOUNTER — OFFICE VISIT (OUTPATIENT)
Dept: PHYSICAL THERAPY | Age: 55
End: 2025-04-22
Attending: ORTHOPAEDIC SURGERY
Payer: COMMERCIAL

## 2025-04-22 PROCEDURE — 97140 MANUAL THERAPY 1/> REGIONS: CPT

## 2025-04-22 PROCEDURE — 97110 THERAPEUTIC EXERCISES: CPT

## 2025-04-22 PROCEDURE — 97112 NEUROMUSCULAR REEDUCATION: CPT

## 2025-04-22 NOTE — PROGRESS NOTES
Patient: Lola Araujo (55 year old, female) Referring Provider:  Insurance:   Diagnosis: Closed fracture of posterior malleolus, left, initial encounter (S82.935A) Yadiel Maria  [employer] [place]   Date of Surgery: N/A Next MD visit:  N/A   Precautions:  None 4/10/25 Referral Information:    Date of Evaluation: Req: 0, Auth: 0, Exp:     04/01/25 POC Auth Visits:          Today's Date   4/22/2025    Subjective  The patient reports that her ankle is stiff at the end of the day. She reports that she did yardwork on Saturday so she was sore Sunday, but she is feeling better now. She is hoping that the weather is good tomorrow so that she can work in the yard again.       Pain: 0/10     Objective        Ankle/Foot       4/9/2025 4/17/2025 4/22/2025   Ankle/Foot ROM/MMT   Rt Foot/Ank Df 39 (CKC) 35 (CKC)    Lt Foot/Ank Df 22 (CKC) 25 (CKC) 28 (CKC)      Assessment  Lola continues to increase her activity level. She is starting to do some work in the yard, navigating uneven terrain. She still notes some fatigue and increased swelling by the end of her work day. She shows slight improvements in CKC ankle DF this date compared to last session. She still demonstrates some stiffness and compensations when descending stairs, controlling descent with her left leg. We progressed some squatting and lunging exercises for ankle mobility. We also progressed balance exercises this date, which were somewhat challening. We will continue to progress ankle mobility as well and ankle strength/stabillity for improved tolerance to ADL's and occupational activities.    Goals (to be met in 16 visits)   Short-Term Goals:  The patient will discontinue use of AD for daily ambulation. Timeframe: 4 visits. (MET 4/9/25)  Patient will normalize ankle CKC DF mobility on his/her ankle, equal to contralateral side, to facilitate mobility requirement for normalized gait pattern and stair navigation and normalized running gait pattern. Timeframe: 4-6 visits.  (IN PROGRESS 4/9/25)  Patient will improve ankle mobility, strength, and endurance to facilitate ability to stand for 2 hour(s) consecutively for community integration and occupational demands. Timeframe: 6 visits. (MET 4/22/25)  The patient will discontinue use of CAM walking boot for daily ambulation. Timeframe: 8 visits.  (MET 4/22/25)  Long-Term Goals:  Patient will improve ankle mobility, strength, and endurance to facilitate walking distances of 2 mile(s) daily for household and community ambulation. Timeframe: 10-12 visits.  Patient will improve ankle mobility and strength for reciprocal stair navigation pattern with no asymmetries for ascending and descending 5 flights of stairs daily for household and community ambulation. Timeframe: 12 visits.  Patient will improve foot/ankle stability and balance for navigating uneven or unsteady terrain for community integration and yardwork. Timeframe: 16 visits.   Patient will improve LEFS score by at least 9 points to indicate a true change in improved function for ADL's and restoring PLF. Timeframe: 16 visits.       Plan  Continue to progress and normalized ankle DF mobility and maximize ankle strength/stability/balance.    Treatment Last 4 Visits  Treatment Day: 4       4/3/2025 4/9/2025 4/17/2025 4/22/2025   LE Treatment   Therapeutic Exercise  Ankle PROM: DF/PF x 10 (L)   Seated calf raise: x 20 (B)   Seated ankle DF: x 20 (B)   Gastroc stretch: x 30\" (B)   Soleus stretch: attempted but d/c due to pain   DLHR: x 20, limited AROM Ankle PROM: DF/PF x 10 (L)   Gastroc stretch: 2 x 30\" (B)   DLHR: x 20, limited AROM  Ankle PROM: DF/PF, INV/EV x 10 (L)   Gastroc stretch: 2 x 30\" (B)   DLHR: x 20     Neuro Re-Education  Tandem balance: 2 x 30\" (B)  BAPS board circles: x 10 CW/CCW (L), right foot on graound  Tandem balance: 2 x 30\" (B)   Tandem balance on airex: x 30\" (B)   Ankle wobble/balance board: A/P, M/L taps x 20, balance 2 x 20\"   BAPS board circles: x 10 CW/CCW  (L), right foot on ground  Tandem balance on airex: x 30\" (B)   Tandem walk on airex balance beam: 1 lap  Lateral walk on airex balance misael: 1 lap        Manual Therapy Soft tissue mobilization: (B) gastroc, soleus, peroneals, tib anterior  Edema massage x 3min to both feet/ankles  Posterior talocrural joint mobs: Grade 3, 4 x 10\" (R)   Posterior talocrural joint mobs with distraction: Grade 3, 4 x 10\" (L) - lighter pressure Soft tissue mobilization: (B) gastroc, soleus, peroneals, tib anterior   Posterior talocrural joint mobs: Grade 3, 4 x 10\" (R)   Posterior talocrural joint mobs with distraction: Grade 3, 4 x 10\" (L)   CKC ankle DF mob: 2 x 8 (L)  Soft tissue mobilization: (B) gastroc, soleus, peroneals, tib anterior   Posterior talocrural joint mobs: Grade 3, 4 x 10\" (R)   Posterior talocrural joint mobs with distraction: Grade 3, 4 x 10\" (L)   CKC ankle DF mob: 2 x 8 (L)  Soft tissue mobilization: (B) gastroc, soleus, peroneals, tib anterior   Posterior talocrural joint mobs: Grade 3, 4 x 10\" (R)   Posterior talocrural joint mobs with distraction: Grade 3, 4 x 10\" (L)   CKC ankle DF mob: 2 x 10 (L)    Therapeutic Activity    Shuttle DLP: x 20, 4 cords  Shuttle SLP: x 20, 3 cords   Static lunges in railing: 1 x 10 (B)    Therapeutic Exercise Minutes  20 8 8   Neuro Re-Educ Minutes  3 18 10   Manual Therapy Minutes 15 17 17 15   Therapeutic Activity Minutes    7   Total Time Of Timed Procedures 15 40 43 40   Total Time Of Service-Based Procedures 0 0 0 0   Total Treatment Time 15 40 43 40        HEP  Access Code: [email or web address]  URL: [email or web address]  Date: 04/01/2025  Prepared by: Vane Wade    Exercises  - Seated Ankle Alphabet  - 1 x daily - 7 x weekly - 1 sets - 1 reps  - Long Sitting Calf Stretch with Strap  - 1 x daily - 7 x weekly - 1 sets - 2 reps - 30\"  hold  - Seated Toe Raise  - 1 x daily - 7 x weekly - 2 sets - 10 reps  - Seated Heel Raise  - 1 x daily - 7 x weekly - 2 sets -  10 reps  - Long Sitting Ankle Eversion with Resistance  - 1 x daily - 7 x weekly - 2 sets - 10 reps - red theraband  - Long Sitting Ankle Plantar Flexion with Resistance  - 1 x daily - 7 x weekly - 2 sets - 10 reps - red theraband  - Long Sitting Ankle Inversion with Resistance  - 1 x daily - 7 x weekly - 2 sets - 10 reps - red theraband  - Long Sitting Ankle Dorsiflexion with Anchored Resistance  - 1 x daily - 7 x weekly - 2 sets - 10 reps - red theraband    Charges     MT x 1, TE x 1, NMR x 1

## 2025-04-23 ENCOUNTER — APPOINTMENT (OUTPATIENT)
Dept: PHYSICAL THERAPY | Age: 55
End: 2025-04-23
Attending: ORTHOPAEDIC SURGERY
Payer: COMMERCIAL

## 2025-04-24 ENCOUNTER — APPOINTMENT (OUTPATIENT)
Dept: PHYSICAL THERAPY | Age: 55
End: 2025-04-24
Attending: ORTHOPAEDIC SURGERY
Payer: COMMERCIAL

## 2025-04-29 ENCOUNTER — APPOINTMENT (OUTPATIENT)
Dept: PHYSICAL THERAPY | Age: 55
End: 2025-04-29
Attending: ORTHOPAEDIC SURGERY
Payer: COMMERCIAL

## 2025-05-01 ENCOUNTER — OFFICE VISIT (OUTPATIENT)
Dept: PHYSICAL THERAPY | Age: 55
End: 2025-05-01
Attending: ORTHOPAEDIC SURGERY
Payer: COMMERCIAL

## 2025-05-01 PROCEDURE — 97112 NEUROMUSCULAR REEDUCATION: CPT

## 2025-05-01 PROCEDURE — 97140 MANUAL THERAPY 1/> REGIONS: CPT

## 2025-05-01 PROCEDURE — 97110 THERAPEUTIC EXERCISES: CPT

## 2025-05-01 NOTE — PROGRESS NOTES
Patient: Lola Araujo (55 year old, female) Referring Provider:  Insurance:   Diagnosis: Closed fracture of posterior malleolus, left, initial encounter (S82.271A) Yadiel Maria  [facility] [place] PPO   Date of Surgery: N/A Next MD visit:  N/A   Precautions:  None 4/10/25 Referral Information:    Date of Evaluation: Req: 0, Auth: 0, Exp:     04/01/25 POC Auth Visits:          Today's Date   5/1/2025    Subjective  The patient reports that she will occasionally get a \"glitch\" in her foot and ankle that limits her ability to walk. She reports that it's after sitting down and then standing up to walk. She reports pain and stiffness that takes about five minutes to resolve. She reports some slighty increased pain today, which she thinks may be due to the weather. She reports that her right ankle is \"fine.\"       Pain: 0/10     Objective  Pain at worst: 4/10         Assessment  La reports minimal issues with her right ankle but still notes some instance of pain in her left ankle that limit her ability to walk and take about 5 minutes to resolve. This may be due to residual stiffness and strength deficits in her left ankle. The patient is with some increased swelling near the medial malleolus which she admits is common at the end of her work day. She was with some mildly increased difficulty with some of the balance and stability exercises this date due to fatigue after her workday. The patient was issued an updated HEP handout to reflect recent progressions in her exercise regimen. We will continue to progress ankle mobility as well as ankle strength and stability for full return to ADL's, occupational activities, and PLF.    Goals (to be met in 16 visits)   Short-Term Goals:  The patient will discontinue use of AD for daily ambulation. Timeframe: 4 visits. (MET 4/9/25)  Patient will normalize ankle CKC DF mobility on his/her ankle, equal to contralateral side, to facilitate mobility requirement for normalized gait pattern and  stair navigation and normalized running gait pattern. Timeframe: 4-6 visits. (IN PROGRESS 4/9/25)  Patient will improve ankle mobility, strength, and endurance to facilitate ability to stand for 2 hour(s) consecutively for community integration and occupational demands. Timeframe: 6 visits. (MET 4/22/25)  The patient will discontinue use of CAM walking boot for daily ambulation. Timeframe: 8 visits.  (MET 4/22/25)  Long-Term Goals:  Patient will improve ankle mobility, strength, and endurance to facilitate walking distances of 2 mile(s) daily for household and community ambulation. Timeframe: 10-12 visits.  Patient will improve ankle mobility and strength for reciprocal stair navigation pattern with no asymmetries for ascending and descending 5 flights of stairs daily for household and community ambulation. Timeframe: 12 visits.  Patient will improve foot/ankle stability and balance for navigating uneven or unsteady terrain for community integration and yardwork. Timeframe: 16 visits.   Patient will improve LEFS score by at least 9 points to indicate a true change in improved function for ADL's and restoring PLF. Timeframe: 16 visits.           Plan  Follow up on tolerance to updated HEP. Continue to progress ankle strength and stability.    Treatment Last 4 Visits  Treatment Day: 5       4/9/2025 4/17/2025 4/22/2025 5/1/2025   LE Treatment   Therapeutic Exercise Ankle PROM: DF/PF x 10 (L)   Seated calf raise: x 20 (B)   Seated ankle DF: x 20 (B)   Gastroc stretch: x 30\" (B)   Soleus stretch: attempted but d/c due to pain   DLHR: x 20, limited AROM Ankle PROM: DF/PF x 10 (L)   Gastroc stretch: 2 x 30\" (B)   DLHR: x 20, limited AROM  Ankle PROM: DF/PF, INV/EV x 10 (L)   Gastroc stretch: 2 x 30\" (B)   DLHR: x 20   Ankle PROM: DF/PF, INV/EV x 10 (L)   Gastroc stretch: 2 x 30\" (B)   DLHR: x 20    Neuro Re-Education Tandem balance: 2 x 30\" (B)  BAPS board circles: x 10 CW/CCW (L), right foot on graound  Tandem balance: 2  x 30\" (B)   Tandem balance on airex: x 30\" (B)   Ankle wobble/balance board: A/P, M/L taps x 20, balance 2 x 20\"   BAPS board circles: x 10 CW/CCW (L), right foot on ground  Tandem balance on airex: x 30\" (B)   Tandem walk on airex balance beam: 1 lap  Lateral walk on airex balance misael: 1 lap      Tandem balance on airex: 2 x 30\" (B)  Ankle wobble/balance board: A/P, M/L taps x 20, balance 2 x 20\"  BOSU fwd step-up: 1 x 10 (B), blue side up   Manual Therapy Soft tissue mobilization: (B) gastroc, soleus, peroneals, tib anterior   Posterior talocrural joint mobs: Grade 3, 4 x 10\" (R)   Posterior talocrural joint mobs with distraction: Grade 3, 4 x 10\" (L)   [facility] ankle DF mob: 2 x 8 (L)  Soft tissue mobilization: (B) gastroc, soleus, peroneals, tib anterior   Posterior talocrural joint mobs: Grade 3, 4 x 10\" (R)   Posterior talocrural joint mobs with distraction: Grade 3, 4 x 10\" (L)   CKC ankle DF mob: 2 x 8 (L)  Soft tissue mobilization: (B) gastroc, soleus, peroneals, tib anterior   Posterior talocrural joint mobs: Grade 3, 4 x 10\" (R)   Posterior talocrural joint mobs with distraction: Grade 3, 4 x 10\" (L)   CK ankle DF mob: 2 x 10 (L)  Soft tissue mobilization: (B) gastroc, soleus, peroneals, tib anterior   Posterior talocrural joint mobs: Grade 3, 4 x 10\" (R)   Posterior talocrural joint mobs with distraction: Grade 3, 4 x 10\" (L)    Therapeutic Activity   Shuttle DLP: x 20, 4 cords  Shuttle SLP: x 20, 3 cords   Static lunges in railing: 1 x 10 (B)     Therapeutic Exercise Minutes 20 8 8 10   Neuro Re-Educ Minutes 3 18 10 12   Manual Therapy Minutes 17 17 15 18   Therapeutic Activity Minutes   7    Total Time Of Timed Procedures 40 43 40 40   Total Time Of Service-Based Procedures 0 0 0 0   Total Treatment Time 40 43 40 40   HEP    Access Code: [email or web address]  URL: [email or web address]  Date: 05/01/2025  Prepared by: Vane Wade    Exercises  - Gastroc Stretch on Wall  - 1 x daily - 7 x weekly  - 1 sets - 2 reps - 30\" hold  - Standing Ankle Dorsiflexion Stretch on Chair  - 1 x daily - 7 x weekly - 2 sets - 8 reps  - Tandem Stance  - 1 x daily - 7 x weekly - 1 sets - 2 reps - 30\" hold  - Standing Heel Raise  - 1 x daily - 7 x weekly - 2 sets - 10 reps  - Tandem Walking  - 1 x daily - 7 x weekly - 2 sets - 10 reps        HEP  Access Code: [email or web address]  URL: [email or web address]  Date: 05/01/2025  Prepared by: Vane Wade    Exercises  - Gastroc Stretch on Wall  - 1 x daily - 7 x weekly - 1 sets - 2 reps - 30\" hold  - Standing Ankle Dorsiflexion Stretch on Chair  - 1 x daily - 7 x weekly - 2 sets - 8 reps  - Tandem Stance  - 1 x daily - 7 x weekly - 1 sets - 2 reps - 30\" hold  - Standing Heel Raise  - 1 x daily - 7 x weekly - 2 sets - 10 reps  - Tandem Walking  - 1 x daily - 7 x weekly - 2 sets - 10 reps    Charges     MT x 1, TE x 1, NMR x 1

## 2025-05-06 ENCOUNTER — OFFICE VISIT (OUTPATIENT)
Dept: PHYSICAL THERAPY | Age: 55
End: 2025-05-06
Attending: ORTHOPAEDIC SURGERY
Payer: COMMERCIAL

## 2025-05-06 PROCEDURE — 97110 THERAPEUTIC EXERCISES: CPT

## 2025-05-06 PROCEDURE — 97140 MANUAL THERAPY 1/> REGIONS: CPT

## 2025-05-06 PROCEDURE — 97112 NEUROMUSCULAR REEDUCATION: CPT

## 2025-05-06 NOTE — PROGRESS NOTES
Patient: Lola Araujo (55 year old, female) Referring Provider:  Insurance:   Diagnosis: Closed fracture of posterior malleolus, left, initial encounter (W35.076V) Yadiel Maria  [facility] [place] PPO   Date of Surgery: N/A Next MD visit:  N/A   Precautions:  None N/A Referral Information:    Date of Evaluation: Req: 0, Auth: 0, Exp:     04/01/25 POC Auth Visits:          Today's Date   5/6/2025    Subjective  The patient reports that she tried wearing Asics and she felt okay. The patient reports that she will wear her work boots when she works in the yard, which she has been doing a lot recently. She reports less \"twinges\" of pain. She reports that she tried the elliptical which went well.        Pain: 0/10     Objective  Pain at worst: 6/10 (wearing high heels at a wedding this past weekend)       Ankle/Foot       4/17/2025 4/22/2025 5/6/2025   Ankle/Foot ROM/MMT   Rt Foot/Ank Df 35 (CKC)     Lt Foot/Ank Df 25 (CKC) 28 (CKC) 33 (CKC)      Assessment  Lola continues to make progress in pain and symptom management, function, mobility, and strength. She is with better tolerance to wearing gym shoes, but admits to a lot of soreness after wearing high heels over the weekend for a wedding she attended. She demonstrates improved CKC ankle DF, near normalized compared to the contralateral side. She also demonstrates improvements in ankle balance and stability and was able to progress to SLS from tandem stance. We continue to progress static and dynamic balance and ankle stabilization exercises in session. The patient was issued an updated HEP handout to reflect recent progression in her exercise regimen. We will continue to progress LE strength, stability, and balance for full return to PLF.    Goals (to be met in 16 visits)   Short-Term Goals:  The patient will discontinue use of AD for daily ambulation. Timeframe: 4 visits. (MET 4/9/25)  Patient will normalize ankle CKC DF mobility on his/her ankle, equal to contralateral side,  to facilitate mobility requirement for normalized gait pattern and stair navigation and normalized running gait pattern. Timeframe: 4-6 visits. (IN PROGRESS 4/9/25)  Patient will improve ankle mobility, strength, and endurance to facilitate ability to stand for 2 hour(s) consecutively for community integration and occupational demands. Timeframe: 6 visits. (MET 4/22/25)  The patient will discontinue use of CAM walking boot for daily ambulation. Timeframe: 8 visits.  (MET 4/22/25)  Long-Term Goals:  Patient will improve ankle mobility, strength, and endurance to facilitate walking distances of 2 mile(s) daily for household and community ambulation. Timeframe: 10-12 visits.  Patient will improve ankle mobility and strength for reciprocal stair navigation pattern with no asymmetries for ascending and descending 5 flights of stairs daily for household and community ambulation. Timeframe: 12 visits.  Patient will improve foot/ankle stability and balance for navigating uneven or unsteady terrain for community integration and yardwork. Timeframe: 16 visits.   Patient will improve LEFS score by at least 9 points to indicate a true change in improved function for ADL's and restoring PLF. Timeframe: 16 visits.               Plan  Follow up on tolerance to updated HEP. Continue to progress ankle strength and stability.    Treatment Last 4 Visits  Treatment Day: 6       4/17/2025 4/22/2025 5/1/2025 5/6/2025   LE Treatment   Therapeutic Exercise Ankle PROM: DF/PF x 10 (L)   Gastroc stretch: 2 x 30\" (B)   DLHR: x 20, limited AROM  Ankle PROM: DF/PF, INV/EV x 10 (L)   Gastroc stretch: 2 x 30\" (B)   DLHR: x 20   Ankle PROM: DF/PF, INV/EV x 10 (L)   Gastroc stretch: 2 x 30\" (B)   DLHR: x 20  Ankle PROM: DF/PF, INV/EV x 10 (L)   Gastroc stretch: 2 x 30\" (B)   Ecc calf raise: x 10   DLHR on edge of step: 2 x 10 on 6\" step   HEP update: Reviewed new HEP handout with new exercises and progressions, provided verbal and written  instructions/cueing for proper technique and common errors/compensations as needed. Reviewed the importance of compliance with home exercise program to facilitate recovery and meet long-term goals.     Neuro Re-Education Tandem balance: 2 x 30\" (B)   Tandem balance on airex: x 30\" (B)   Ankle wobble/balance board: A/P, M/L taps x 20, balance 2 x 20\"   BAPS board circles: x 10 CW/CCW (L), right foot on ground  Tandem balance on airex: x 30\" (B)   Tandem walk on airex balance beam: 1 lap  Lateral walk on airex balance misael: 1 lap      Tandem balance on airex: 2 x 30\" (B)  Ankle wobble/balance board: A/P, M/L taps x 20, balance 2 x 20\"  BOSU fwd step-up: 1 x 10 (B), blue side up Tandem balance on airex: x 30\" (B)   SLS: 2 x 30\" (B)   Ankle wobble/balance board: A/P, M/L taps x 20, balance 2 x 20\"   BOSU fwd step-up: 1 x 10 (B), blue side up   BOSU lateral step-up-and-over: 1 x 10 (B), blue side up    Manual Therapy Soft tissue mobilization: (B) gastroc, soleus, peroneals, tib anterior   Posterior talocrural joint mobs: Grade 3, 4 x 10\" (R)   Posterior talocrural joint mobs with distraction: Grade 3, 4 x 10\" (L)   CKC ankle DF mob: 2 x 8 (L)  Soft tissue mobilization: (B) gastroc, soleus, peroneals, tib anterior   Posterior talocrural joint mobs: Grade 3, 4 x 10\" (R)   Posterior talocrural joint mobs with distraction: Grade 3, 4 x 10\" (L)   CKC ankle DF mob: 2 x 10 (L)  Soft tissue mobilization: (B) gastroc, soleus, peroneals, tib anterior   Posterior talocrural joint mobs: Grade 3, 4 x 10\" (R)   Posterior talocrural joint mobs with distraction: Grade 3, 4 x 10\" (L)  Soft tissue mobilization: (B) gastroc, soleus, peroneals, tib anterior   Posterior talocrural joint mobs: Grade 3, 4 x 10\" (R)   Posterior talocrural joint mobs with distraction: Grade 3, 4 x 10\" (L)   CKC ankle posterior talocrural joint mob with ankle DF MWM: 2 x 10 (L) with belt    Therapeutic Activity  Shuttle DLP: x 20, 4 cords  Shuttle SLP: x 20, 3  cords   Static lunges in railing: 1 x 10 (B)      Therapeutic Exercise Minutes 8 8 10 10   Neuro Re-Educ Minutes 18 10 12 15   Manual Therapy Minutes 17 15 18 15   Therapeutic Activity Minutes  7     Total Time Of Timed Procedures 43 40 40 40   Total Time Of Service-Based Procedures 0 0 0 0   Total Treatment Time 43 40 40 40   HEP   Access Code: [email or web address]  URL: [email or web address][employer]"/  Date: 05/01/2025  Prepared by: Vane Wade    Exercises  - Gastroc Stretch on Wall  - 1 x daily - 7 x weekly - 1 sets - 2 reps - 30\" hold  - Standing Ankle Dorsiflexion Stretch on Chair  - 1 x daily - 7 x weekly - 2 sets - 8 reps  - Tandem Stance  - 1 x daily - 7 x weekly - 1 sets - 2 reps - 30\" hold  - Standing Heel Raise  - 1 x daily - 7 x weekly - 2 sets - 10 reps  - Tandem Walking  - 1 x daily - 7 x weekly - 2 sets - 10 reps Access Code: [email or web address]  URL: [email or web address]"[employer]  Date: 05/06/2025  Prepared by: Vane Wade    Exercises  - Gastroc Stretch on Wall  - 1 x daily - 7 x weekly - 1 sets - 2 reps - 30\" hold  - Standing Ankle Dorsiflexion Stretch on Chair  - 1 x daily - 7 x weekly - 2 sets - 8 reps  - Standing Eccentric Heel Raise  - 1 x daily - 7 x weekly - 1 sets - 10 reps  - Heel Raise on Step  - 1 x daily - 7 x weekly - 2 sets - 10 reps  - Single Leg Stance  - 1 x daily - 7 x weekly - 1 sets - 2 reps - 30\" hold        HEP  Access Code: [email or web address]  URL: [email or web address]"[employer]  Date: 05/06/2025  Prepared by: Vane Wade    Exercises  - Gastroc Stretch on Wall  - 1 x daily - 7 x weekly - 1 sets - 2 reps - 30\" hold  - Standing Ankle Dorsiflexion Stretch on Chair  - 1 x daily - 7 x weekly - 2 sets - 8 reps  - Standing Eccentric Heel Raise  - 1 x daily - 7 x weekly - 1 sets - 10 reps  - Heel Raise on Step  - 1 x daily - 7 x weekly - 2 sets - 10 reps  - Single Leg Stance  - 1 x daily - 7 x weekly - 1 sets - 2 reps - 30\" hold    Charges     MT x 1, TE x 1, NMR x  1

## 2025-05-08 ENCOUNTER — OFFICE VISIT (OUTPATIENT)
Dept: PHYSICAL THERAPY | Age: 55
End: 2025-05-08
Attending: ORTHOPAEDIC SURGERY
Payer: COMMERCIAL

## 2025-05-08 PROCEDURE — 97112 NEUROMUSCULAR REEDUCATION: CPT

## 2025-05-08 PROCEDURE — 97110 THERAPEUTIC EXERCISES: CPT

## 2025-05-08 PROCEDURE — 97140 MANUAL THERAPY 1/> REGIONS: CPT

## 2025-05-08 NOTE — PROGRESS NOTES
Patient: Lola Araujo (55 year old, female) Referring Provider:  Insurance:   Diagnosis: Closed fracture of posterior malleolus, left, initial encounter (U13.306X) Yadiel Maria  [employer] [place] PPO   Date of Surgery: N/A Next MD visit:  N/A   Precautions:  None N/A Referral Information:    Date of Evaluation: Req: 0, Auth: 0, Exp:     04/01/25 POC Auth Visits:          Today's Date   5/8/2025    Subjective  The patient reports that the back of heel hurt this morning when she woke up. She reports that she did some work in the yard after her work shift yesterday.       Pain: 0/10     Objective  Pain at worst: 3/10      Balance:  SLS: (L) 30sec, (R) 30sec     Assessment  Lola demonstrates continued improvement in balance and stability, demonstrates SLB for a full 30 seconds on both legs this session. We continue to progress static and dynamic strength and stability at her ankle. We will do a formal progress summary next session to document progress.    Goals (to be met in 16 visits)   Short-Term Goals:  The patient will discontinue use of AD for daily ambulation. Timeframe: 4 visits. (MET 4/9/25)  Patient will normalize ankle CKC DF mobility on his/her ankle, equal to contralateral side, to facilitate mobility requirement for normalized gait pattern and stair navigation and normalized running gait pattern. Timeframe: 4-6 visits. (IN PROGRESS 4/9/25)  Patient will improve ankle mobility, strength, and endurance to facilitate ability to stand for 2 hour(s) consecutively for community integration and occupational demands. Timeframe: 6 visits. (MET 4/22/25)  The patient will discontinue use of CAM walking boot for daily ambulation. Timeframe: 8 visits.  (MET 4/22/25)  Long-Term Goals:  Patient will improve ankle mobility, strength, and endurance to facilitate walking distances of 2 mile(s) daily for household and community ambulation. Timeframe: 10-12 visits.  Patient will improve ankle mobility and strength for reciprocal stair  navigation pattern with no asymmetries for ascending and descending 5 flights of stairs daily for household and community ambulation. Timeframe: 12 visits.  Patient will improve foot/ankle stability and balance for navigating uneven or unsteady terrain for community integration and yardwork. Timeframe: 16 visits.   Patient will improve LEFS score by at least 9 points to indicate a true change in improved function for ADL's and restoring PLF. Timeframe: 16 visits.                   Plan  Progress summary next session.    Treatment Last 4 Visits  Treatment Day: 7 4/22/2025 5/1/2025 5/6/2025 5/8/2025   LE Treatment   Therapeutic Exercise Ankle PROM: DF/PF, INV/EV x 10 (L)   Gastroc stretch: 2 x 30\" (B)   DLHR: x 20   Ankle PROM: DF/PF, INV/EV x 10 (L)   Gastroc stretch: 2 x 30\" (B)   DLHR: x 20  Ankle PROM: DF/PF, INV/EV x 10 (L)   Gastroc stretch: 2 x 30\" (B)   Ecc calf raise: x 10   DLHR on edge of step: 2 x 10 on 6\" step   HEP update: Reviewed new HEP handout with new exercises and progressions, provided verbal and written instructions/cueing for proper technique and common errors/compensations as needed. Reviewed the importance of compliance with home exercise program to facilitate recovery and meet long-term goals.   Ankle PROM: DF/PF, INV/EV x 10 (L)   Gastroc stretch: 2 x 30\" (B)   Ecc calf raise: x 10   DLHR on edge of step: 2 x 10 on 6\" step      Neuro Re-Education Tandem balance on airex: x 30\" (B)   Tandem walk on airex balance beam: 1 lap  Lateral walk on airex balance misael: 1 lap      Tandem balance on airex: 2 x 30\" (B)  Ankle wobble/balance board: A/P, M/L taps x 20, balance 2 x 20\"  BOSU fwd step-up: 1 x 10 (B), blue side up Tandem balance on airex: x 30\" (B)   SLS: 2 x 30\" (B)   Ankle wobble/balance board: A/P, M/L taps x 20, balance 2 x 20\"   BOSU fwd step-up: 1 x 10 (B), blue side up   BOSU lateral step-up-and-over: 1 x 10 (B), blue side up  SLS: 2 x 30\" (B)   Ankle wobble/balance board: A/P,  M/L taps x 20, balance 2 x 20\"   BOSU lateral step-up-and-over: 1 x 10 (B), blue side up      Manual Therapy Soft tissue mobilization: (B) gastroc, soleus, peroneals, tib anterior   Posterior talocrural joint mobs: Grade 3, 4 x 10\" (R)   Posterior talocrural joint mobs with distraction: Grade 3, 4 x 10\" (L)   CKC ankle DF mob: 2 x 10 (L)  Soft tissue mobilization: (B) gastroc, soleus, peroneals, tib anterior   Posterior talocrural joint mobs: Grade 3, 4 x 10\" (R)   Posterior talocrural joint mobs with distraction: Grade 3, 4 x 10\" (L)  Soft tissue mobilization: (B) gastroc, soleus, peroneals, tib anterior   Posterior talocrural joint mobs: Grade 3, 4 x 10\" (R)   Posterior talocrural joint mobs with distraction: Grade 3, 4 x 10\" (L)   CKC ankle posterior talocrural joint mob with ankle DF MWM: 2 x 10 (L) with belt  Soft tissue mobilization: (B) gastroc, soleus, peroneals, tib anterior   Posterior talocrural joint mobs: Grade 3, 4 x 10\" (R)   Posterior talocrural joint mobs with distraction: Grade 3, 4 x 10\" (L)   CKC ankle posterior talocrural joint mob with ankle DF MWM: 2 x 10 (L) with belt    Therapeutic Activity Shuttle DLP: x 20, 4 cords  Shuttle SLP: x 20, 3 cords   Static lunges in railing: 1 x 10 (B)       Therapeutic Exercise Minutes 8 10 10 8   Neuro Re-Educ Minutes 10 12 15 12   Manual Therapy Minutes 15 18 15 20   Therapeutic Activity Minutes 7      Total Time Of Timed Procedures 40 40 40 40   Total Time Of Service-Based Procedures 0 0 0 0   Total Treatment Time 40 40 40 40   HEP  Access Code: [email or web address]  URL: [email or web address]  Date: 05/01/2025  Prepared by: Vane Wade    Exercises  - Gastroc Stretch on Wall  - 1 x daily - 7 x weekly - 1 sets - 2 reps - 30\" hold  - Standing Ankle Dorsiflexion Stretch on Chair  - 1 x daily - 7 x weekly - 2 sets - 8 reps  - Tandem Stance  - 1 x daily - 7 x weekly - 1 sets - 2 reps - 30\" hold  - Standing Heel Raise  - 1 x daily - 7 x weekly - 2  sets - 10 reps  - Tandem Walking  - 1 x daily - 7 x weekly - 2 sets - 10 reps Access Code: [email or web address]  URL: [email or web address]  Date: 05/06/2025  Prepared by: Vane Wade    Exercises  - Gastroc Stretch on Wall  - 1 x daily - 7 x weekly - 1 sets - 2 reps - 30\" hold  - Standing Ankle Dorsiflexion Stretch on Chair  - 1 x daily - 7 x weekly - 2 sets - 8 reps  - Standing Eccentric Heel Raise  - 1 x daily - 7 x weekly - 1 sets - 10 reps  - Heel Raise on Step  - 1 x daily - 7 x weekly - 2 sets - 10 reps  - Single Leg Stance  - 1 x daily - 7 x weekly - 1 sets - 2 reps - 30\" hold         HEP  Access Code: [email or web address]  URL: [email or web address]  Date: 05/06/2025  Prepared by: Vane Wade    Exercises  - Gastroc Stretch on Wall  - 1 x daily - 7 x weekly - 1 sets - 2 reps - 30\" hold  - Standing Ankle Dorsiflexion Stretch on Chair  - 1 x daily - 7 x weekly - 2 sets - 8 reps  - Standing Eccentric Heel Raise  - 1 x daily - 7 x weekly - 1 sets - 10 reps  - Heel Raise on Step  - 1 x daily - 7 x weekly - 2 sets - 10 reps  - Single Leg Stance  - 1 x daily - 7 x weekly - 1 sets - 2 reps - 30\" hold    Charges     MT x 1, NMR x 1, TE x 1

## 2025-05-13 ENCOUNTER — APPOINTMENT (OUTPATIENT)
Dept: PHYSICAL THERAPY | Age: 55
End: 2025-05-13
Attending: ORTHOPAEDIC SURGERY
Payer: COMMERCIAL

## 2025-05-20 ENCOUNTER — APPOINTMENT (OUTPATIENT)
Dept: PHYSICAL THERAPY | Age: 55
End: 2025-05-20
Attending: ORTHOPAEDIC SURGERY
Payer: COMMERCIAL

## 2025-05-21 ENCOUNTER — OFFICE VISIT (OUTPATIENT)
Dept: PHYSICAL THERAPY | Age: 55
End: 2025-05-21
Attending: ORTHOPAEDIC SURGERY
Payer: COMMERCIAL

## 2025-05-21 PROCEDURE — 97140 MANUAL THERAPY 1/> REGIONS: CPT

## 2025-05-21 PROCEDURE — 97112 NEUROMUSCULAR REEDUCATION: CPT

## 2025-05-21 NOTE — PROGRESS NOTES
Patient: Lola Araujo (55 year old, female) Referring Provider:  Insurance:   Diagnosis: Closed fracture of posterior malleolus, left, initial encounter (S82.326T) Yadiel Maria  [employer] [place]   Date of Surgery: N/A Next MD visit:  N/A   Precautions:  None 5/22/25 Referral Information:    Date of Evaluation: Req: 0, Auth: 0, Exp:     04/01/25 POC Auth Visits:         PHYSICAL THERAPY PROGRESS SUMMARY:       Today's Date   5/21/2025    Subjective  The patient reports that she is now able to go up and down the stairs with reciprocal gait pattern. She was also able to walk [place] the [place] in [place] this weekend which she thinks has helped the pain in her heel. The patient feels that she's 80% recovered. Her primary remaining complaints are some residual swelling and \"be able to do everything.\" She reports remaining instances of pain in her ankle depending on the way she moves. She reports better swelling management when she wears a taller sock at the end of the day.       Pain: 0/10     Objective  Pain at worst: 2/10       Ankle/Foot       4/22/2025 5/6/2025 5/21/2025   Ankle/Foot ROM/MMT   Rt Foot/Ank Pf MMT (S1)   20 reps SLHR   Lt Foot/Ank Pf MMT (S1)   14 reps SLHR   Rt Foot/Ank Df   36   Lt Foot/Ank Df 28 (CKC) 33 (CKC) 36   Rt Foot/Ank Df MMT (L4)   5/5   Lt Foot/Ank Df MMT (L4)   4+/5   Rt Foot/Ank Inversion MMT   5/5   Lt Foot/Ank Inversion MMT   4+/5   Rt Foot/Ank Eversion MMT   4+/5   Lt Foot/Ank Eversion MMT   4/5*     Balance:   SLS: (L) 30sec, (R) 30sec  SLS on airex: (L) 21sec, (R) 23sec     Assessment  Lola is a 55-year-old female that presented to physical therapy s/p (L) ankle fracture. The patient has been seen for 8 sessions in physical therapy for treatment including MT interventions for soft tissue and joint mobility as well as exercises for progression in strength, stability, and endurance. The patient demonstrates normalized CKC ankle DF mobility. She has significantly improved her performance with  balance exercises and is with similar SLB times on her involved side both on level and compliant surfaces. She did have some mild pain with eversion strength testing on her left side and is with some minor calf strength deficits wit SLHR test on involved side. She is able to wear various shoewear and is better tolerating prolonged standing, walking long distances, and occupational actiivities. She was able to navigate uneven surfaces walking at the [facility] over the weekend. She still notes some some occasional instances of pain depending on the direction she moves and still needs some UE assist when navigating stairs. The patient would benefit from continued PT for further progression in ankle strength and stability for full return to F.    Goals (to be met in 16 visits)   Short-Term Goals:  The patient will discontinue use of AD for daily ambulation. Timeframe: 4 visits. (MET 4/9/25)  Patient will normalize ankle CKC DF mobility on his/her ankle, equal to contralateral side, to facilitate mobility requirement for normalized gait pattern and stair navigation and normalized running gait pattern. Timeframe: 4-6 visits. (MET 5/21/25)  Patient will improve ankle mobility, strength, and endurance to facilitate ability to stand for 2 hour(s) consecutively for community integration and occupational demands. Timeframe: 6 visits. (MET 4/22/25)  The patient will discontinue use of CAM walking boot for daily ambulation. Timeframe: 8 visits.  (MET 4/22/25)  Long-Term Goals:  Patient will improve ankle mobility, strength, and endurance to facilitate walking distances of 2 mile(s) daily for household and community ambulation. Timeframe: 10-12 visits. (MET 5/21/25)  Patient will improve ankle mobility and strength for reciprocal stair navigation pattern with no asymmetries for ascending and descending 5 flights of stairs daily for household and community ambulation. Timeframe: 12 visits. (IN PROGRESS 5/21/25)  Patient will  improve foot/ankle stability and balance for navigating uneven or unsteady terrain for community integration and yardwork. Timeframe: 16 visits. (MET 5/21/25)  Patient will improve LEFS score by at least 9 points to indicate a true change in improved function for ADL's and restoring PLF. Timeframe: 16 visits.  (IN PROGRESS 5/21/25)     Plan  Patient will be seen for 1-2x/week for an additional 3-4 weeks, for an additional 6-8 visits, over a 90 day period. We will re-evaluate the patient at that time in order to determine functional progress, evaluate short-term goal completion, and establish an updated plan of care. Possible treatment interventions will/may include: Therapeutic Activities, Therapeutic Exercise, Neuromuscular Re-education, Manual Therapy, Home Exercise Program Instruction, Patient Education, Self-Care/Home Management, and Modalities as needed.    Treatment Last 4 Visits  Treatment Day: 8       5/1/2025 5/6/2025 5/8/2025 5/21/2025   LE Treatment   Therapeutic Exercise Ankle PROM: DF/PF, INV/EV x 10 (L)   Gastroc stretch: 2 x 30\" (B)   DLHR: x 20  Ankle PROM: DF/PF, INV/EV x 10 (L)   Gastroc stretch: 2 x 30\" (B)   Ecc calf raise: x 10   DLHR on edge of step: 2 x 10 on 6\" step   HEP update: Reviewed new HEP handout with new exercises and progressions, provided verbal and written instructions/cueing for proper technique and common errors/compensations as needed. Reviewed the importance of compliance with home exercise program to facilitate recovery and meet long-term goals.   Ankle PROM: DF/PF, INV/EV x 10 (L)   Gastroc stretch: 2 x 30\" (B)   Ecc calf raise: x 10   DLHR on edge of step: 2 x 10 on 6\" step    SLHR: x 20 (B)    Neuro Re-Education Tandem balance on airex: 2 x 30\" (B)  Ankle wobble/balance board: A/P, M/L taps x 20, balance 2 x 20\"  BOSU fwd step-up: 1 x 10 (B), blue side up Tandem balance on airex: x 30\" (B)   SLS: 2 x 30\" (B)   Ankle wobble/balance board: A/P, M/L taps x 20, balance 2 x 20\"    BOSU fwd step-up: 1 x 10 (B), blue side up   BOSU lateral step-up-and-over: 1 x 10 (B), blue side up  SLS: 2 x 30\" (B)   Ankle wobble/balance board: A/P, M/L taps x 20, balance 2 x 20\"   BOSU lateral step-up-and-over: 1 x 10 (B), blue side up    SLS: 2 x 30\" (B)   SLB on airex: x 30\" (B)   Ankle wobble/balance board: A/P, M/L taps x 20, balance 2 x 20\"   Tandem walk on airex balance beam: x 2 laps  Lateral walk on airex balance beam: x 2 laps   BOSU lateral step-up-and-over: 1 x 10 (B), blue side up   BOSU step-up: 1 x 10 (B), blue side up   BAPS board circles: 2 x 10 CW/CWW (L) with right foot on ground    Manual Therapy Soft tissue mobilization: (B) gastroc, soleus, peroneals, tib anterior   Posterior talocrural joint mobs: Grade 3, 4 x 10\" (R)   Posterior talocrural joint mobs with distraction: Grade 3, 4 x 10\" (L)  Soft tissue mobilization: (B) gastroc, soleus, peroneals, tib anterior   Posterior talocrural joint mobs: Grade 3, 4 x 10\" (R)   Posterior talocrural joint mobs with distraction: Grade 3, 4 x 10\" (L)   CKC ankle posterior talocrural joint mob with ankle DF MWM: 2 x 10 (L) with belt  Soft tissue mobilization: (B) gastroc, soleus, peroneals, tib anterior   Posterior talocrural joint mobs: Grade 3, 4 x 10\" (R)   Posterior talocrural joint mobs with distraction: Grade 3, 4 x 10\" (L)   CKC ankle posterior talocrural joint mob with ankle DF MWM: 2 x 10 (L) with belt  Soft tissue mobilization: (B) gastroc, soleus, peroneals, tib anterior   Posterior talocrural joint mobs: Grade 3, 4 x 10\" (R)   Posterior talocrural joint mobs with distraction: Grade 3, 4 x 10\" (L)    Therapeutic Exercise Minutes 10 10 8 3   Neuro Re-Educ Minutes 12 15 12 17   Manual Therapy Minutes 18 15 20 20   Total Time Of Timed Procedures 40 40 40 40   Total Time Of Service-Based Procedures 0 0 0 0   Total Treatment Time 40 40 40 40   HEP Access Code: [email or web address]  URL: [email or web address]  Date: 05/01/2025  Prepared  by: Vane Wade    Exercises  - Gastroc Stretch on Wall  - 1 x daily - 7 x weekly - 1 sets - 2 reps - 30\" hold  - Standing Ankle Dorsiflexion Stretch on Chair  - 1 x daily - 7 x weekly - 2 sets - 8 reps  - Tandem Stance  - 1 x daily - 7 x weekly - 1 sets - 2 reps - 30\" hold  - Standing Heel Raise  - 1 x daily - 7 x weekly - 2 sets - 10 reps  - Tandem Walking  - 1 x daily - 7 x weekly - 2 sets - 10 reps Access Code: [email or web address]  URL: [email or web address]  Date: 05/06/2025  Prepared by: Vane Wade    Exercises  - Gastroc Stretch on Wall  - 1 x daily - 7 x weekly - 1 sets - 2 reps - 30\" hold  - Standing Ankle Dorsiflexion Stretch on Chair  - 1 x daily - 7 x weekly - 2 sets - 8 reps  - Standing Eccentric Heel Raise  - 1 x daily - 7 x weekly - 1 sets - 10 reps  - Heel Raise on Step  - 1 x daily - 7 x weekly - 2 sets - 10 reps  - Single Leg Stance  - 1 x daily - 7 x weekly - 1 sets - 2 reps - 30\" hold  Access Code: [email or web address]  URL: [email or web address]  Date: 05/21/2025  Prepared by: Vane Wade    Exercises  - Gastroc Stretch on Step  - 1 x daily - 7 x weekly - 1 sets - 2 reps - 30\" hold  - Single Leg Stance  - 1 x daily - 7 x weekly - 1 sets - 2 reps - 30\" hold  - Single Leg Heel Raise with Counter Support  - 1 x daily - 7 x weekly - 2 sets - 10 reps        HEP  Access Code: [email or web address]  URL: [email or web address]  Date: 05/21/2025  Prepared by: Vane Wade    Exercises  - Gastroc Stretch on Step  - 1 x daily - 7 x weekly - 1 sets - 2 reps - 30\" hold  - Single Leg Stance  - 1 x daily - 7 x weekly - 1 sets - 2 reps - 30\" hold  - Single Leg Heel Raise with Counter Support  - 1 x daily - 7 x weekly - 2 sets - 10 reps    Charges     MT x 1, NMR x 2

## 2025-05-22 ENCOUNTER — APPOINTMENT (OUTPATIENT)
Dept: PHYSICAL THERAPY | Age: 55
End: 2025-05-22
Attending: ORTHOPAEDIC SURGERY
Payer: COMMERCIAL

## 2025-05-23 ENCOUNTER — APPOINTMENT (OUTPATIENT)
Dept: PHYSICAL THERAPY | Age: 55
End: 2025-05-23
Attending: ORTHOPAEDIC SURGERY
Payer: COMMERCIAL

## 2025-05-23 ENCOUNTER — OFFICE VISIT (OUTPATIENT)
Dept: PHYSICAL THERAPY | Age: 55
End: 2025-05-23
Attending: ORTHOPAEDIC SURGERY
Payer: COMMERCIAL

## 2025-05-23 PROCEDURE — 97110 THERAPEUTIC EXERCISES: CPT

## 2025-05-23 PROCEDURE — 97140 MANUAL THERAPY 1/> REGIONS: CPT

## 2025-05-23 NOTE — PROGRESS NOTES
Patient: Lola Araujo (55 year old, female) Referring Provider:  Insurance:   Diagnosis: Closed fracture of posterior malleolus, left, initial encounter (S82.678A) Yadiel Maria  [employer] [place]   Date of Surgery: N/A Next MD visit:  N/A   Precautions:  None 5/22/25 Referral Information:    Date of Evaluation: Req: 0, Auth: 0, Exp:     04/01/25 POC Auth Visits:          Today's Date   5/23/2025    Subjective  The patient reports that she saw her doctor who wants to her again in six weeks. The patient reports that she brought up her concern about the remaining pain in the achilles tendon but her doctor didn't seem concerned and thought it would resolve with continued PT.       Pain: 0/10     Objective          Assessment  Lola arrived today after follow-up with her MD who is pleased with her progress. She had some slighlty increased swelling this after, coming to her PT after her work shift today. She showed improved balance and stability with SLB on airex. The patient was educated that some of her residual achilles tendon pain may be due to residual calf strength deficits that we are continuing to address in PT. During BOSU step-ups, the patient experienced an episode of sharp pain over the plantar aspect of her left foot. We tried some cross friction/TPR massage afterwards to help with pain. The patient was advised to try self-massage with a lacrosse ball at home for additional pain management.    Goals (to be met in 16 visits)   Short-Term Goals:  The patient will discontinue use of AD for daily ambulation. Timeframe: 4 visits. (MET 4/9/25)  Patient will normalize ankle CKC DF mobility on his/her ankle, equal to contralateral side, to facilitate mobility requirement for normalized gait pattern and stair navigation and normalized running gait pattern. Timeframe: 4-6 visits. (MET 5/21/25)  Patient will improve ankle mobility, strength, and endurance to facilitate ability to stand for 2 hour(s) consecutively for  community integration and occupational demands. Timeframe: 6 visits. (MET 4/22/25)  The patient will discontinue use of CAM walking boot for daily ambulation. Timeframe: 8 visits.  (MET 4/22/25)  Long-Term Goals:  Patient will improve ankle mobility, strength, and endurance to facilitate walking distances of 2 mile(s) daily for household and community ambulation. Timeframe: 10-12 visits. (MET 5/21/25)  Patient will improve ankle mobility and strength for reciprocal stair navigation pattern with no asymmetries for ascending and descending 5 flights of stairs daily for household and community ambulation. Timeframe: 12 visits. (IN PROGRESS 5/21/25)  Patient will improve foot/ankle stability and balance for navigating uneven or unsteady terrain for community integration and yardwork. Timeframe: 16 visits. (MET 5/21/25)  Patient will improve LEFS score by at least 9 points to indicate a true change in improved function for ADL's and restoring PLF. Timeframe: 16 visits.  (IN PROGRESS 5/21/25)         Plan  Continue to progress ankle strength and stability as well as calf strength.    Treatment Last 4 Visits  Treatment Day: 9       5/6/2025 5/8/2025 5/21/2025 5/23/2025   LE Treatment   Therapeutic Exercise Ankle PROM: DF/PF, INV/EV x 10 (L)   Gastroc stretch: 2 x 30\" (B)   Ecc calf raise: x 10   DLHR on edge of step: 2 x 10 on 6\" step   HEP update: Reviewed new HEP handout with new exercises and progressions, provided verbal and written instructions/cueing for proper technique and common errors/compensations as needed. Reviewed the importance of compliance with home exercise program to facilitate recovery and meet long-term goals.   Ankle PROM: DF/PF, INV/EV x 10 (L)   Gastroc stretch: 2 x 30\" (B)   Ecc calf raise: x 10   DLHR on edge of step: 2 x 10 on 6\" step    SLHR: x 20 (B)  SLHR: x 20 (B)  Plantar fascia stretch against wall: 2 x 30\" (L)    Neuro Re-Education Tandem balance on airex: x 30\" (B)   SLS: 2 x 30\" (B)    Ankle wobble/balance board: A/P, M/L taps x 20, balance 2 x 20\"   BOSU fwd step-up: 1 x 10 (B), blue side up   BOSU lateral step-up-and-over: 1 x 10 (B), blue side up  SLS: 2 x 30\" (B)   Ankle wobble/balance board: A/P, M/L taps x 20, balance 2 x 20\"   BOSU lateral step-up-and-over: 1 x 10 (B), blue side up    SLS: 2 x 30\" (B)   SLB on airex: x 30\" (B)   Ankle wobble/balance board: A/P, M/L taps x 20, balance 2 x 20\"   Tandem walk on airex balance beam: x 2 laps  Lateral walk on airex balance beam: x 2 laps   BOSU lateral step-up-and-over: 1 x 10 (B), blue side up   BOSU step-up: 1 x 10 (B), blue side up   BAPS board circles: 2 x 10 CW/CWW (L) with right foot on ground  SLS: 2 x 30\" (B)   SLB on airex: x 30\" (B)   Ankle wobble/balance board: A/P, M/L taps x 20, balance 2 x 20\"   BAPS board circles: 2 x 10 CW/CWW (L) with right foot on ground   BOSU step-up: 1 x 10 (B), blue side up - discontinued due to sharp left foot pain    Manual Therapy Soft tissue mobilization: (B) gastroc, soleus, peroneals, tib anterior   Posterior talocrural joint mobs: Grade 3, 4 x 10\" (R)   Posterior talocrural joint mobs with distraction: Grade 3, 4 x 10\" (L)   CKC ankle posterior talocrural joint mob with ankle DF MWM: 2 x 10 (L) with belt  Soft tissue mobilization: (B) gastroc, soleus, peroneals, tib anterior   Posterior talocrural joint mobs: Grade 3, 4 x 10\" (R)   Posterior talocrural joint mobs with distraction: Grade 3, 4 x 10\" (L)   CKC ankle posterior talocrural joint mob with ankle DF MWM: 2 x 10 (L) with belt  Soft tissue mobilization: (B) gastroc, soleus, peroneals, tib anterior   Posterior talocrural joint mobs: Grade 3, 4 x 10\" (R)   Posterior talocrural joint mobs with distraction: Grade 3, 4 x 10\" (L)  Soft tissue mobilization: (B) gastroc, soleus, peroneals, tib anterior   Posterior talocrural joint mobs: Grade 3, 4 x 10\" (R)   Posterior talocrural joint mobs with distraction: Grade 3, 4 x 10\" (L)   Cross friction:  distal plantar fascia (post-exercise)      Therapeutic Exercise Minutes 10 8 3 5   Neuro Re-Educ Minutes 15 12 17 15   Manual Therapy Minutes 15 20 20 20   Total Time Of Timed Procedures 40 40 40 40   Total Time Of Service-Based Procedures 0 0 0 0   Total Treatment Time 40 40 40 40   HEP Access Code: [email or web address]  URL: [email or web address]  Date: 05/06/2025  Prepared by: Vane Wade    Exercises  - Gastroc Stretch on Wall  - 1 x daily - 7 x weekly - 1 sets - 2 reps - 30\" hold  - Standing Ankle Dorsiflexion Stretch on Chair  - 1 x daily - 7 x weekly - 2 sets - 8 reps  - Standing Eccentric Heel Raise  - 1 x daily - 7 x weekly - 1 sets - 10 reps  - Heel Raise on Step  - 1 x daily - 7 x weekly - 2 sets - 10 reps  - Single Leg Stance  - 1 x daily - 7 x weekly - 1 sets - 2 reps - 30\" hold  Access Code: [email or web address]  URL: [email or web address]  Date: 05/21/2025  Prepared by: Vane Wade    Exercises  - Gastroc Stretch on Step  - 1 x daily - 7 x weekly - 1 sets - 2 reps - 30\" hold  - Single Leg Stance  - 1 x daily - 7 x weekly - 1 sets - 2 reps - 30\" hold  - Single Leg Heel Raise with Counter Support  - 1 x daily - 7 x weekly - 2 sets - 10 reps         HEP  Access Code: [email or web address]  URL: [email or web address]  Date: 05/21/2025  Prepared by: Vane Wade    Exercises  - Gastroc Stretch on Step  - 1 x daily - 7 x weekly - 1 sets - 2 reps - 30\" hold  - Single Leg Stance  - 1 x daily - 7 x weekly - 1 sets - 2 reps - 30\" hold  - Single Leg Heel Raise with Counter Support  - 1 x daily - 7 x weekly - 2 sets - 10 reps    Charges     MT x 2, TE x 1

## 2025-05-27 ENCOUNTER — OFFICE VISIT (OUTPATIENT)
Dept: PHYSICAL THERAPY | Age: 55
End: 2025-05-27
Attending: ORTHOPAEDIC SURGERY
Payer: COMMERCIAL

## 2025-05-27 PROCEDURE — 97140 MANUAL THERAPY 1/> REGIONS: CPT

## 2025-05-27 PROCEDURE — 97112 NEUROMUSCULAR REEDUCATION: CPT

## 2025-05-27 NOTE — PROGRESS NOTES
Patient: Lola Araujo (55 year old, female) Referring Provider:  Insurance:   Diagnosis: Closed fracture of posterior malleolus, left, initial encounter (S83.937X) Yadiel Maria  [employer] [place]   Date of Surgery: N/A Next MD visit:  N/A   Precautions:  None 5/22/25 Referral Information:    Date of Evaluation: Req: 0, Auth: 0, Exp:     04/01/25 POC Auth Visits:          Today's Date   5/27/2025    Subjective  The patient repotrs that her foot is feeling \"fine.\" She reports that she hasn't necessarily had the plantar foot pain any more consistently since her last visit, but it's still exacerbated by random motions. The patient reports that she is still with some swelling at the end of her work day. She reports that \"for the most part\" it tolerates her occupational activities well.       Pain: 0/10     Objective  Pain at worst: 5/10       Assessment  Performed additional MT interventions for the plantar fascia due to increased pain after her last session and remaining complaints of intermittent pain over involved area. The patient was able to complete all exercises as instructed. We avoid the BOSU this date due to aggravation of symptoms when used last session. The patient was able to complete exercises as instructed. We will continue to progress foot and ankle stabilization as tolerated.    Goals (to be met in 16 visits)   Short-Term Goals:  The patient will discontinue use of AD for daily ambulation. Timeframe: 4 visits. (MET 4/9/25)  Patient will normalize ankle CKC DF mobility on his/her ankle, equal to contralateral side, to facilitate mobility requirement for normalized gait pattern and stair navigation and normalized running gait pattern. Timeframe: 4-6 visits. (MET 5/21/25)  Patient will improve ankle mobility, strength, and endurance to facilitate ability to stand for 2 hour(s) consecutively for community integration and occupational demands. Timeframe: 6 visits. (MET 4/22/25)  The patient will discontinue use of  CAM walking boot for daily ambulation. Timeframe: 8 visits.  (MET 4/22/25)  Long-Term Goals:  Patient will improve ankle mobility, strength, and endurance to facilitate walking distances of 2 mile(s) daily for household and community ambulation. Timeframe: 10-12 visits. (MET 5/21/25)  Patient will improve ankle mobility and strength for reciprocal stair navigation pattern with no asymmetries for ascending and descending 5 flights of stairs daily for household and community ambulation. Timeframe: 12 visits. (IN PROGRESS 5/21/25)  Patient will improve foot/ankle stability and balance for navigating uneven or unsteady terrain for community integration and yardwork. Timeframe: 16 visits. (MET 5/21/25)  Patient will improve LEFS score by at least 9 points to indicate a true change in improved function for ADL's and restoring PLF. Timeframe: 16 visits.  (IN PROGRESS 5/21/25)             Plan  Continue to progress ankle strength and stability as well as calf strength.    Treatment Last 4 Visits  Treatment Day: 10       5/8/2025 5/21/2025 5/23/2025 5/27/2025   LE Treatment   Therapeutic Exercise Ankle PROM: DF/PF, INV/EV x 10 (L)   Gastroc stretch: 2 x 30\" (B)   Ecc calf raise: x 10   DLHR on edge of step: 2 x 10 on 6\" step    SLHR: x 20 (B)  SLHR: x 20 (B)  Plantar fascia stretch against wall: 2 x 30\" (L)  Plantar fascia stretch against wall: 2 x 30\" (L)   Neuro Re-Education SLS: 2 x 30\" (B)   Ankle wobble/balance board: A/P, M/L taps x 20, balance 2 x 20\"   BOSU lateral step-up-and-over: 1 x 10 (B), blue side up    SLS: 2 x 30\" (B)   SLB on airex: x 30\" (B)   Ankle wobble/balance board: A/P, M/L taps x 20, balance 2 x 20\"   Tandem walk on airex balance beam: x 2 laps  Lateral walk on airex balance beam: x 2 laps   BOSU lateral step-up-and-over: 1 x 10 (B), blue side up   BOSU step-up: 1 x 10 (B), blue side up   BAPS board circles: 2 x 10 CW/CWW (L) with right foot on ground  SLS: 2 x 30\" (B)   SLB on airex: x 30\" (B)    Ankle wobble/balance board: A/P, M/L taps x 20, balance 2 x 20\"   BAPS board circles: 2 x 10 CW/CWW (L) with right foot on ground   BOSU step-up: 1 x 10 (B), blue side up - discontinued due to sharp left foot pain  SLB on airex: x 30\" (B)   Ankle wobble/balance board: A/P, M/L taps x 20, balance 2 x 20\"   Tandem walk on airex balance beam: x 2 laps  Lateral walk on airex balance beam: x 2 laps  BAPS board circles: 2 x 10 CW/CWW (L) with right foot on ground    Manual Therapy Soft tissue mobilization: (B) gastroc, soleus, peroneals, tib anterior   Posterior talocrural joint mobs: Grade 3, 4 x 10\" (R)   Posterior talocrural joint mobs with distraction: Grade 3, 4 x 10\" (L)   CKC ankle posterior talocrural joint mob with ankle DF MWM: 2 x 10 (L) with belt  Soft tissue mobilization: (B) gastroc, soleus, peroneals, tib anterior   Posterior talocrural joint mobs: Grade 3, 4 x 10\" (R)   Posterior talocrural joint mobs with distraction: Grade 3, 4 x 10\" (L)  Soft tissue mobilization: (B) gastroc, soleus, peroneals, tib anterior   Posterior talocrural joint mobs: Grade 3, 4 x 10\" (R)   Posterior talocrural joint mobs with distraction: Grade 3, 4 x 10\" (L)   Cross friction: distal plantar fascia (post-exercise)    Soft tissue mobilization: (B) gastroc, soleus, peroneals, tib anterior   Posterior talocrural joint mobs: Grade 3, 4 x 10\" (R)   Posterior talocrural joint mobs with distraction: Grade 3, 4 x 10\" (L)   Cross friction massage: (L) distal plantar fascia  IASTM: (L) plantar fascia   Therapeutic Exercise Minutes 8 3 5 3   Neuro Re-Educ Minutes 12 17 15 15   Manual Therapy Minutes 20 20 20 20   Total Time Of Timed Procedures 40 40 40 38   Total Time Of Service-Based Procedures 0 0 0 0   Total Treatment Time 40 40 40 38   HEP  Access Code: [email or web address]  URL: [email or web address]  Date: 05/21/2025  Prepared by: Vane Wade    Exercises  - Gastroc Stretch on Step  - 1 x daily - 7 x weekly - 1 sets - 2  reps - 30\" hold  - Single Leg Stance  - 1 x daily - 7 x weekly - 1 sets - 2 reps - 30\" hold  - Single Leg Heel Raise with Counter Support  - 1 x daily - 7 x weekly - 2 sets - 10 reps          HEP  Access Code: [email or web address]  URL: [email or web address]  Date: 05/21/2025  Prepared by: Vane Wade    Exercises  - Gastroc Stretch on Step  - 1 x daily - 7 x weekly - 1 sets - 2 reps - 30\" hold  - Single Leg Stance  - 1 x daily - 7 x weekly - 1 sets - 2 reps - 30\" hold  - Single Leg Heel Raise with Counter Support  - 1 x daily - 7 x weekly - 2 sets - 10 reps    Charges     MT x 2, NMR x 1

## 2025-05-29 ENCOUNTER — APPOINTMENT (OUTPATIENT)
Dept: PHYSICAL THERAPY | Age: 55
End: 2025-05-29
Attending: ORTHOPAEDIC SURGERY
Payer: COMMERCIAL

## 2025-06-03 ENCOUNTER — OFFICE VISIT (OUTPATIENT)
Dept: PHYSICAL THERAPY | Age: 55
End: 2025-06-03
Attending: ORTHOPAEDIC SURGERY
Payer: COMMERCIAL

## 2025-06-03 PROCEDURE — 97110 THERAPEUTIC EXERCISES: CPT

## 2025-06-03 PROCEDURE — 97140 MANUAL THERAPY 1/> REGIONS: CPT

## 2025-06-03 PROCEDURE — 97112 NEUROMUSCULAR REEDUCATION: CPT

## 2025-06-03 NOTE — PROGRESS NOTES
Patient: Lola Araujo (55 year old, female) Referring Provider:  Insurance:   Diagnosis: Closed fracture of posterior malleolus, left, initial encounter (S85.330A) Yadiel Maria  [facility] [place] PPO   Date of Surgery: N/A Next MD visit:  N/A   Precautions:  None 5/22/25 Referral Information:    Date of Evaluation: Req: 0, Auth: 0, Exp:     04/01/25 POC Auth Visits:          Today's Date   6/3/2025    Subjective  \"I was doing wonderufll all week. I could go down the stairs without a problem but then I might have hurt it doing yardwork, mowing the grass.\" She reports that she is no longer having heel pain.       Pain: 2/10     Objective          Assessment  Lola had a stretch of a few days with good pain management and ADL tolerance, but did arrive today with some reports of increased pain after doing some yardwork and mowing the lawn. She was with TTP over the plantar aspect of the foot. During BAPS board [place], the patient did have an episode of sharp increased pain over the medila ankle. We spent the rest of the session focusing on pain mangement. We will continue to progress ankle stabilization to facilitate better tolerance to navigating uneven surfaces and terrain.    Goals (to be met in 16 visits)   Short-Term Goals:  The patient will discontinue use of AD for daily ambulation. Timeframe: 4 visits. (MET 4/9/25)  Patient will normalize ankle CKC DF mobility on his/her ankle, equal to contralateral side, to facilitate mobility requirement for normalized gait pattern and stair navigation and normalized running gait pattern. Timeframe: 4-6 visits. (MET 5/21/25)  Patient will improve ankle mobility, strength, and endurance to facilitate ability to stand for 2 hour(s) consecutively for community integration and occupational demands. Timeframe: 6 visits. (MET 4/22/25)  The patient will discontinue use of CAM walking boot for daily ambulation. Timeframe: 8 visits.  (MET 4/22/25)  Long-Term Goals:  Patient will improve ankle  mobility, strength, and endurance to facilitate walking distances of 2 mile(s) daily for household and community ambulation. Timeframe: 10-12 visits. (MET 5/21/25)  Patient will improve ankle mobility and strength for reciprocal stair navigation pattern with no asymmetries for ascending and descending 5 flights of stairs daily for household and community ambulation. Timeframe: 12 visits. (IN PROGRESS 5/21/25)  Patient will improve foot/ankle stability and balance for navigating uneven or unsteady terrain for community integration and yardwork. Timeframe: 16 visits. (MET 5/21/25)  Patient will improve LEFS score by at least 9 points to indicate a true change in improved function for ADL's and restoring PLF. Timeframe: 16 visits.  (IN PROGRESS 5/21/25)                 Plan  Follow up on pain and symptom management and tolerance to navigating unven surfaces.    Treatment Last 4 Visits  Treatment Day: 11       5/21/2025 5/23/2025 5/27/2025 6/3/2025   LE Treatment   Therapeutic Exercise SLHR: x 20 (B)  SLHR: x 20 (B)  Plantar fascia stretch against wall: 2 x 30\" (L)  Plantar fascia stretch against wall: 2 x 30\" (L) Lunging calf stretch: x 30\" (B)   Plantar fascia stretch: x 30\" (B)   SLHR: 2 x 10 (B)    Neuro Re-Education SLS: 2 x 30\" (B)   SLB on airex: x 30\" (B)   Ankle wobble/balance board: A/P, M/L taps x 20, balance 2 x 20\"   Tandem walk on airex balance beam: x 2 laps  Lateral walk on airex balance beam: x 2 laps   BOSU lateral step-up-and-over: 1 x 10 (B), blue side up   BOSU step-up: 1 x 10 (B), blue side up   BAPS board circles: 2 x 10 CW/CWW (L) with right foot on ground  SLS: 2 x 30\" (B)   SLB on airex: x 30\" (B)   Ankle wobble/balance board: A/P, M/L taps x 20, balance 2 x 20\"   BAPS board circles: 2 x 10 CW/CWW (L) with right foot on ground   BOSU step-up: 1 x 10 (B), blue side up - discontinued due to sharp left foot pain  SLB on airex: x 30\" (B)   Ankle wobble/balance board: A/P, M/L taps x 20, balance 2 x  20\"   Tandem walk on airex balance beam: x 2 laps  Lateral walk on airex balance beam: x 2 laps  BAPS board circles: 2 x 10 CW/CWW (L) with right foot on ground  SLB on airex: 2 x 20\" (B)   Ankle wobble/balance board: A/P, M/L taps x 20, balance 2 x 20\"   BAPS board circles: 2 x 10 CW/CWW (L) with right foot on ground - mild pain over medial ankle with ankle inverstion   Manual Therapy Soft tissue mobilization: (B) gastroc, soleus, peroneals, tib anterior   Posterior talocrural joint mobs: Grade 3, 4 x 10\" (R)   Posterior talocrural joint mobs with distraction: Grade 3, 4 x 10\" (L)  Soft tissue mobilization: (B) gastroc, soleus, peroneals, tib anterior   Posterior talocrural joint mobs: Grade 3, 4 x 10\" (R)   Posterior talocrural joint mobs with distraction: Grade 3, 4 x 10\" (L)   Cross friction: distal plantar fascia (post-exercise)    Soft tissue mobilization: (B) gastroc, soleus, peroneals, tib anterior   Posterior talocrural joint mobs: Grade 3, 4 x 10\" (R)   Posterior talocrural joint mobs with distraction: Grade 3, 4 x 10\" (L)   Cross friction massage: (L) distal plantar fascia  IASTM: (L) plantar fascia Soft tissue mobilization: (B) gastroc, soleus, peroneals, tib anterior   Posterior talocrural joint mobs: Grade 3, 4 x 10\" (R)   Posterior talocrural joint mobs with distraction: Grade 3, 4 x 10\" (L)   Cross friction massage: (L) distal plantar fascia   Anterior fibular head joint mobs: Grade 3, 4 x 10\" (L)   Superior fibular joint mobs: Grade 3, 4 x 10\" (L)   Therapeutic Exercise Minutes 3 5 3 10   Neuro Re-Educ Minutes 17 15 15 10   Manual Therapy Minutes 20 20 20 20   Total Time Of Timed Procedures 40 40 38 40   Total Time Of Service-Based Procedures 0 0 0 0   Total Treatment Time 40 40 38 40   HEP Access Code: [email or web address]  URL: [email or web address]  Date: 05/21/2025  Prepared by: Vane Wade    Exercises  - Gastroc Stretch on Step  - 1 x daily - 7 x weekly - 1 sets - 2 reps - 30\"  hold  - Single Leg Stance  - 1 x daily - 7 x weekly - 1 sets - 2 reps - 30\" hold  - Single Leg Heel Raise with Counter Support  - 1 x daily - 7 x weekly - 2 sets - 10 reps           HEP  Access Code: [email or web address]  URL: [email or web address]  Date: 05/21/2025  Prepared by: Vane Wade    Exercises  - Gastroc Stretch on Step  - 1 x daily - 7 x weekly - 1 sets - 2 reps - 30\" hold  - Single Leg Stance  - 1 x daily - 7 x weekly - 1 sets - 2 reps - 30\" hold  - Single Leg Heel Raise with Counter Support  - 1 x daily - 7 x weekly - 2 sets - 10 reps    Charges     MT x 1, NMR x 1, TE x 1

## 2025-06-09 ENCOUNTER — APPOINTMENT (OUTPATIENT)
Dept: FAMILY MEDICINE | Age: 55
End: 2025-06-09

## 2025-06-10 ENCOUNTER — OFFICE VISIT (OUTPATIENT)
Dept: PHYSICAL THERAPY | Age: 55
End: 2025-06-10
Attending: ORTHOPAEDIC SURGERY
Payer: COMMERCIAL

## 2025-06-10 ENCOUNTER — APPOINTMENT (OUTPATIENT)
Dept: PHYSICAL THERAPY | Age: 55
End: 2025-06-10
Attending: ORTHOPAEDIC SURGERY
Payer: COMMERCIAL

## 2025-06-10 PROCEDURE — 97110 THERAPEUTIC EXERCISES: CPT

## 2025-06-10 PROCEDURE — 97140 MANUAL THERAPY 1/> REGIONS: CPT

## 2025-06-10 NOTE — PROGRESS NOTES
Patient: Lola Araujo (55 year old, female) Referring Provider:  Insurance:   Diagnosis: Closed fracture of posterior malleolus, left, initial encounter (D58.806H) Yadiel Maria  [employer] [place]   Date of Surgery: N/A Next MD visit:  N/A   Precautions:  None 5/22/25 Referral Information:    Date of Evaluation: Req: 0, Auth: 0, Exp:     04/01/25 POC Auth Visits:          Today's Date   6/10/2025    Subjective  The patient reports that she continues to have moments of sharp pain in her ankle, happening about once every 4-5 days at this point. \"When I get it, I have to walk it off kind of sensation.\"       Pain: 0/10     Objective          Assessment  Lola continues to have epsiodes of sharp pain over the medial and anterior ankle, that while decreasing in frequency, cause pain that lingers afterwards and affects her ability to walk. We tried different mobilizations to the talocrural joint and medial malleolus for possible positional fault. The patient was with less pain with end-range passive DF with sustained posterior glide to the talus as well as after CKC ankle DF MWM. The patient was instructed in self-mobilization for the talocrural joint using a theraband to do at home. We willl continue to progress ankle strength and stability as tolerated.    Goals (to be met in 16 visits)   Short-Term Goals:  The patient will discontinue use of AD for daily ambulation. Timeframe: 4 visits. (MET 4/9/25)  Patient will normalize ankle CKC DF mobility on his/her ankle, equal to contralateral side, to facilitate mobility requirement for normalized gait pattern and stair navigation and normalized running gait pattern. Timeframe: 4-6 visits. (MET 5/21/25)  Patient will improve ankle mobility, strength, and endurance to facilitate ability to stand for 2 hour(s) consecutively for community integration and occupational demands. Timeframe: 6 visits. (MET 4/22/25)  The patient will discontinue use of CAM walking boot for daily ambulation.  Timeframe: 8 visits.  (MET 4/22/25)  Long-Term Goals:  Patient will improve ankle mobility, strength, and endurance to facilitate walking distances of 2 mile(s) daily for household and community ambulation. Timeframe: 10-12 visits. (MET 5/21/25)  Patient will improve ankle mobility and strength for reciprocal stair navigation pattern with no asymmetries for ascending and descending 5 flights of stairs daily for household and community ambulation. Timeframe: 12 visits. (IN PROGRESS 5/21/25)  Patient will improve foot/ankle stability and balance for navigating uneven or unsteady terrain for community integration and yardwork. Timeframe: 16 visits. (MET 5/21/25)  Patient will improve LEFS score by at least 9 points to indicate a true change in improved function for ADL's and restoring PLF. Timeframe: 16 visits.  (IN PROGRESS 5/21/25)                     Plan  Follow-up on response to self-mobilization after instances of pain.    Treatment Last 4 Visits  Treatment Day: 12       5/23/2025 5/27/2025 6/3/2025 6/10/2025   LE Treatment   Therapeutic Exercise SLHR: x 20 (B)  Plantar fascia stretch against wall: 2 x 30\" (L)  Plantar fascia stretch against wall: 2 x 30\" (L) Lunging calf stretch: x 30\" (B)   Plantar fascia stretch: x 30\" (B)   SLHR: 2 x 10 (B)  Lunging calf stretch: x 30\" (B)   Plantar fascia stretch: x 30\" (B)   SLHR: 2 x 10 (B)   Patient instruction in posterior talocrural joint mobilization with theraband for home.    Neuro Re-Education SLS: 2 x 30\" (B)   SLB on airex: x 30\" (B)   Ankle wobble/balance board: A/P, M/L taps x 20, balance 2 x 20\"   BAPS board circles: 2 x 10 CW/CWW (L) with right foot on ground   BOSU step-up: 1 x 10 (B), blue side up - discontinued due to sharp left foot pain  SLB on airex: x 30\" (B)   Ankle wobble/balance board: A/P, M/L taps x 20, balance 2 x 20\"   Tandem walk on airex balance beam: x 2 laps  Lateral walk on airex balance beam: x 2 laps  BAPS board circles: 2 x 10 CW/CWW (L)  with right foot on ground  SLB on airex: 2 x 20\" (B)   Ankle wobble/balance board: A/P, M/L taps x 20, balance 2 x 20\"   BAPS board circles: 2 x 10 CW/CWW (L) with right foot on ground - mild pain over medial ankle with ankle inverstion Ankle wobble/balance board: A/P, M/L taps x 20, balance 2 x 20\"      Manual Therapy Soft tissue mobilization: (B) gastroc, soleus, peroneals, tib anterior   Posterior talocrural joint mobs: Grade 3, 4 x 10\" (R)   Posterior talocrural joint mobs with distraction: Grade 3, 4 x 10\" (L)   Cross friction: distal plantar fascia (post-exercise)    Soft tissue mobilization: (B) gastroc, soleus, peroneals, tib anterior   Posterior talocrural joint mobs: Grade 3, 4 x 10\" (R)   Posterior talocrural joint mobs with distraction: Grade 3, 4 x 10\" (L)   Cross friction massage: (L) distal plantar fascia  IASTM: (L) plantar fascia Soft tissue mobilization: (B) gastroc, soleus, peroneals, tib anterior   Posterior talocrural joint mobs: Grade 3, 4 x 10\" (R)   Posterior talocrural joint mobs with distraction: Grade 3, 4 x 10\" (L)   Cross friction massage: (L) distal plantar fascia   Anterior fibular head joint mobs: Grade 3, 4 x 10\" (L)   Superior fibular joint mobs: Grade 3, 4 x 10\" (L) Superior glide to medial malleolus: Grade 3, 4 x 10\" - no change in pain with end-range passive DF  Posterior glide to medial malleolus: Grade 3, 4 x 10\" (L) - increased pain with end-range passive DF  Anterior glide to medial malleolus: Grade 3, 4 x 10\" (L) - mildly improved pain with end-range passive DF  Posterior talocrural joint mobs: Grade 3, 4  x 10\" (L)   Posterior talocrural joint mobs with distraction: Grade 3, 4 x 10\" (L)   Sustained posterior glide with passive ankle DF: 2 x 8 (L)   CKC ankle DF MWM: 2 x 10 with belt   Therapeutic Exercise Minutes 5 3 10 10   Neuro Re-Educ Minutes 15 15 10 5   Manual Therapy Minutes 20 20 20 25   Total Time Of Timed Procedures 40 38 40 40   Total Time Of Service-Based  Procedures 0 0 0 0   Total Treatment Time 40 38 40 40        HEP  Access Code: [email or web address]  URL: [email or web address]  Date: 05/21/2025  Prepared by: Vane Wade    Exercises  - Gastroc Stretch on Step  - 1 x daily - 7 x weekly - 1 sets - 2 reps - 30\" hold  - Single Leg Stance  - 1 x daily - 7 x weekly - 1 sets - 2 reps - 30\" hold  - Single Leg Heel Raise with Counter Support  - 1 x daily - 7 x weekly - 2 sets - 10 reps    Charges     MT x 2, TE x 1

## 2025-06-12 ENCOUNTER — APPOINTMENT (OUTPATIENT)
Dept: PHYSICAL THERAPY | Age: 55
End: 2025-06-12
Attending: ORTHOPAEDIC SURGERY
Payer: COMMERCIAL

## 2025-06-17 ENCOUNTER — APPOINTMENT (OUTPATIENT)
Dept: PHYSICAL THERAPY | Age: 55
End: 2025-06-17
Attending: ORTHOPAEDIC SURGERY
Payer: COMMERCIAL

## 2025-06-19 ENCOUNTER — OFFICE VISIT (OUTPATIENT)
Dept: PHYSICAL THERAPY | Age: 55
End: 2025-06-19
Attending: ORTHOPAEDIC SURGERY
Payer: COMMERCIAL

## 2025-06-19 ENCOUNTER — APPOINTMENT (OUTPATIENT)
Dept: PHYSICAL THERAPY | Age: 55
End: 2025-06-19
Attending: ORTHOPAEDIC SURGERY
Payer: COMMERCIAL

## 2025-06-19 PROCEDURE — 97112 NEUROMUSCULAR REEDUCATION: CPT

## 2025-06-19 PROCEDURE — 97140 MANUAL THERAPY 1/> REGIONS: CPT

## 2025-06-19 PROCEDURE — 97110 THERAPEUTIC EXERCISES: CPT

## 2025-06-19 NOTE — PROGRESS NOTES
Patient: Lola Araujo (55 year old, female) Referring Provider:  Insurance:   Diagnosis: Closed fracture of posterior malleolus, left, initial encounter (S82.100A) Yadiel Maria  [employer] [place]   Date of Surgery: N/A Next MD visit:  N/A   Precautions:  None 5/22/25 Referral Information:    Date of Evaluation: Req: 0, Auth: 0, Exp:     04/01/25 POC Auth Visits:          Today's Date   6/19/2025    Subjective  The patient reports that she has walked in her gym shoes the past four days and was even able to walk in the sand and hasn't had an issue.       Pain: 0/10     Objective  Pain at worst: 0/10       Assessment  Lola has had no episode of sharp pain since her last visit and has been able to wear gym shoes to work as well as [place] the [place] without any adverse reaction. The patient was without pain with end-range ankle DF this visit. We progressed dynamic balance exercises on her involved side and she demonstrates similar balance and stability on her left compared to her right. We will see the patient again next week and will assess pain and symptom management with possible progression to discharge next session.    Goals (to be met in 16 visits)   Short-Term Goals:  The patient will discontinue use of AD for daily ambulation. Timeframe: 4 visits. (MET 4/9/25)  Patient will normalize ankle CKC DF mobility on his/her ankle, equal to contralateral side, to facilitate mobility requirement for normalized gait pattern and stair navigation and normalized running gait pattern. Timeframe: 4-6 visits. (MET 5/21/25)  Patient will improve ankle mobility, strength, and endurance to facilitate ability to stand for 2 hour(s) consecutively for community integration and occupational demands. Timeframe: 6 visits. (MET 4/22/25)  The patient will discontinue use of CAM walking boot for daily ambulation. Timeframe: 8 visits.  (MET 4/22/25)  Long-Term Goals:  Patient will improve ankle mobility, strength, and endurance to  facilitate walking distances of 2 mile(s) daily for household and community ambulation. Timeframe: 10-12 visits. (MET 5/21/25)  Patient will improve ankle mobility and strength for reciprocal stair navigation pattern with no asymmetries for ascending and descending 5 flights of stairs daily for household and community ambulation. Timeframe: 12 visits. (IN PROGRESS 5/21/25)  Patient will improve foot/ankle stability and balance for navigating uneven or unsteady terrain for community integration and yardwork. Timeframe: 16 visits. (MET 5/21/25)  Patient will improve LEFS score by at least 9 points to indicate a true change in improved function for ADL's and restoring PLF. Timeframe: 16 visits.  (IN PROGRESS 5/21/25)     Plan  Possible progression to discharge next session is pain and symptom management is maintained.    Treatment Last 4 Visits  Treatment Day: 13       5/27/2025 6/3/2025 6/10/2025 6/19/2025   LE Treatment   Therapeutic Exercise Plantar fascia stretch against wall: 2 x 30\" (L) Lunging calf stretch: x 30\" (B)   Plantar fascia stretch: x 30\" (B)   SLHR: 2 x 10 (B)  Lunging calf stretch: x 30\" (B)   Plantar fascia stretch: x 30\" (B)   SLHR: 2 x 10 (B)   Patient instruction in posterior talocrural joint mobilization with theraband for home.  Lunging calf stretch: x 30\" (B)   Plantar fascia stretch: x 30\" (B)   SLHR: 2 x 10 (B)      Neuro Re-Education SLB on airex: x 30\" (B)   Ankle wobble/balance board: A/P, M/L taps x 20, balance 2 x 20\"   Tandem walk on airex balance beam: x 2 laps  Lateral walk on airex balance beam: x 2 laps  BAPS board circles: 2 x 10 CW/CWW (L) with right foot on ground  SLB on airex: 2 x 20\" (B)   Ankle wobble/balance board: A/P, M/L taps x 20, balance 2 x 20\"   BAPS board circles: 2 x 10 CW/CWW (L) with right foot on ground - mild pain over medial ankle with ankle inverstion Ankle wobble/balance board: A/P, M/L taps x 20, balance 2 x 20\"    Ankle wobble/balance board: A/P, M/L taps  x 20, balance 2 x 20\"   SLB on airex: x 30\" (B)   SLS cone reach: 1 x 10 (B), cone on 6\" step   Step-up to high knee: 1 x 10 (B), 6\" step    Manual Therapy Soft tissue mobilization: (B) gastroc, soleus, peroneals, tib anterior   Posterior talocrural joint mobs: Grade 3, 4 x 10\" (R)   Posterior talocrural joint mobs with distraction: Grade 3, 4 x 10\" (L)   Cross friction massage: (L) distal plantar fascia  IASTM: (L) plantar fascia Soft tissue mobilization: (B) gastroc, soleus, peroneals, tib anterior   Posterior talocrural joint mobs: Grade 3, 4 x 10\" (R)   Posterior talocrural joint mobs with distraction: Grade 3, 4 x 10\" (L)   Cross friction massage: (L) distal plantar fascia   Anterior fibular head joint mobs: Grade 3, 4 x 10\" (L)   Superior fibular joint mobs: Grade 3, 4 x 10\" (L) Superior glide to medial malleolus: Grade 3, 4 x 10\" - no change in pain with end-range passive DF  Posterior glide to medial malleolus: Grade 3, 4 x 10\" (L) - increased pain with end-range passive DF  Anterior glide to medial malleolus: Grade 3, 4 x 10\" (L) - mildly improved pain with end-range passive DF  Posterior talocrural joint mobs: Grade 3, 4  x 10\" (L)   Posterior talocrural joint mobs with distraction: Grade 3, 4 x 10\" (L)   Sustained posterior glide with passive ankle DF: 2 x 8 (L)   CKC ankle DF MWM: 2 x 10 with belt Posterior talocrural joint mobs: Grade 3, 4  x 10\" (L)   Posterior talocrural joint mobs with distraction: Grade 3, 4 x 10\" (L)   Sustained posterior glide with passive ankle DF: 2 x 8 (L)    Therapeutic Exercise Minutes 3 10 10 6   Neuro Re-Educ Minutes 15 10 5 15   Manual Therapy Minutes 20 20 25 19   Total Time Of Timed Procedures 38 40 40 40   Total Time Of Service-Based Procedures 0 0 0 0   Total Treatment Time 38 40 40 40        HEP  Access Code: [email or web address]  URL: [email or web address]  Date: 05/21/2025  Prepared by: Vane Wade    Exercises  - Gastroc Stretch on Step  - 1 x daily - 7  x weekly - 1 sets - 2 reps - 30\" hold  - Single Leg Stance  - 1 x daily - 7 x weekly - 1 sets - 2 reps - 30\" hold  - Single Leg Heel Raise with Counter Support  - 1 x daily - 7 x weekly - 2 sets - 10 reps    Charges     MT x 1, TE x 1, NMR x 1

## 2025-06-24 ENCOUNTER — APPOINTMENT (OUTPATIENT)
Dept: PHYSICAL THERAPY | Age: 55
End: 2025-06-24
Attending: ORTHOPAEDIC SURGERY
Payer: COMMERCIAL

## 2025-06-26 ENCOUNTER — APPOINTMENT (OUTPATIENT)
Dept: PHYSICAL THERAPY | Age: 55
End: 2025-06-26
Attending: ORTHOPAEDIC SURGERY
Payer: COMMERCIAL

## 2025-07-01 ENCOUNTER — APPOINTMENT (OUTPATIENT)
Dept: PHYSICAL THERAPY | Age: 55
End: 2025-07-01
Attending: ORTHOPAEDIC SURGERY
Payer: COMMERCIAL

## 2025-07-03 ENCOUNTER — OFFICE VISIT (OUTPATIENT)
Dept: PHYSICAL THERAPY | Age: 55
End: 2025-07-03
Attending: ORTHOPAEDIC SURGERY
Payer: COMMERCIAL

## 2025-07-03 PROCEDURE — 97140 MANUAL THERAPY 1/> REGIONS: CPT

## 2025-07-03 PROCEDURE — 97110 THERAPEUTIC EXERCISES: CPT

## 2025-07-03 NOTE — PROGRESS NOTES
Patient: Lola Araujo (55 year old, female) Referring Provider:  Insurance:   Diagnosis: Closed fracture of posterior malleolus, left, initial encounter (S82.876A) Yadiel Maria  [facility] [place] PPO   Date of Surgery: N/A Next MD visit:  N/A   Precautions:  None 5/22/25 Referral Information:    Date of Evaluation: Req: 0, Auth: 0, Exp:     04/01/25 POC Auth Visits:         PHYSICAL THERAPY DISCHARGE SUMMARY:       Today's Date   7/3/2025    Subjective  The patient reports very minimal problems the past two weeks. \"Every once in a while\" she will get a sharp pain, but it doesn't last long. The patient is agreeable to progression to discharge this session. The patient reports that she feels that she's 95-98% recovered.       Pain: 0/10     Objective        Ankle/Foot       5/6/2025 5/21/2025 7/3/2025   Ankle/Foot ROM/MMT   Rt Foot/Ank Pf MMT (S1)  20 reps SLHR 20   Lt Foot/Ank Pf MMT (S1)  14 reps SLHR 20   Rt Foot/Ank Df  36 38 CKC   Lt Foot/Ank Df 33 (CKC) 36 36 CKC   Rt Foot/Ank Df MMT (L4)  5/5 5/5   Lt Foot/Ank Df MMT (L4)  4+/5 5/5   Rt Foot/Ank Inversion MMT  5/5 5/5   Lt Foot/Ank Inversion MMT  4+/5 5/5   Rt Foot/Ank Eversion MMT  4+/5 5/5   Lt Foot/Ank Eversion MMT  4/5* 5/5     Balance:   SLS: (R): 30sec, (L) 30sec     Assessment  Lola is a 55-year-old female that presented to physical therapy after left medial ankle fracture. The patient has been seen for 14 sessions in physical therapy for treatment including MT internvetions for soft tissue and joint mobility as well as exercises for progression in mobility, strength, and balance/stability. The patient demonstrates normalized ankle mobility, strength, and balane on her left side compared to her right. She has met all short and long-term goals and is agreeable to progression to discharge with PT. The patient will be discharged from PT to continue with HEP.    Goals (to be met in 16 visits)   Short-Term Goals:  The patient will discontinue use of AD for daily  ambulation. Timeframe: 4 visits. (MET 4/9/25)  Patient will normalize ankle CKC DF mobility on his/her ankle, equal to contralateral side, to facilitate mobility requirement for normalized gait pattern and stair navigation and normalized running gait pattern. Timeframe: 4-6 visits. (MET 5/21/25)  Patient will improve ankle mobility, strength, and endurance to facilitate ability to stand for 2 hour(s) consecutively for community integration and occupational demands. Timeframe: 6 visits. (MET 4/22/25)  The patient will discontinue use of CAM walking boot for daily ambulation. Timeframe: 8 visits.  (MET 4/22/25)  Long-Term Goals:  Patient will improve ankle mobility, strength, and endurance to facilitate walking distances of 2 mile(s) daily for household and community ambulation. Timeframe: 10-12 visits. (MET 5/21/25)  Patient will improve ankle mobility and strength for reciprocal stair navigation pattern with no asymmetries for ascending and descending 5 flights of stairs daily for household and community ambulation. Timeframe: 12 visits. (MET 7/3/25)  Patient will improve foot/ankle stability and balance for navigating uneven or unsteady terrain for community integration and yardwork. Timeframe: 16 visits. (MET 5/21/25)  Patient will improve LEFS score by at least 9 points to indicate a true change in improved function for ADL's and restoring PLF. Timeframe: 16 visits. (MET 7/3/25)     Plan  Patient will be discharged from PT to [facility] with HEP.    Treatment Last 4 Visits  Treatment Day: 14       6/3/2025 6/10/2025 6/19/2025 7/3/2025   LE Treatment   Therapeutic Exercise Lunging calf stretch: x 30\" (B)   Plantar fascia stretch: x 30\" (B)   SLHR: 2 x 10 (B)  Lunging calf stretch: x 30\" (B)   Plantar fascia stretch: x 30\" (B)   SLHR: 2 x 10 (B)   Patient instruction in posterior talocrural joint mobilization with theraband for home.  Lunging calf stretch: x 30\" (B)   Plantar fascia stretch: x 30\" (B)   SLHR: 2 x 10  (B)    Lunging calf stretch: x 30\" (B)   Plantar fascia stretch: x 30\" (B)   SLHR: 2 x 10 (B)   HEP update: Reviewed new HEP handout with new exercises and progressions, provided verbal and written instructions/cueing for proper technique and common errors/compensations as needed. Reviewed the importance of compliance with home exercise program to facilitate recovery and meet long-term goals.     Neuro Re-Education SLB on airex: 2 x 20\" (B)   Ankle wobble/balance board: A/P, M/L taps x 20, balance 2 x 20\"   BAPS board circles: 2 x 10 CW/CWW (L) with right foot on ground - mild pain over medial ankle with ankle inverstion Ankle wobble/balance board: A/P, M/L taps x 20, balance 2 x 20\"    Ankle wobble/balance board: A/P, M/L taps x 20, balance 2 x 20\"   SLB on airex: x 30\" (B)   SLS cone reach: 1 x 10 (B), cone on 6\" step   Step-up to high knee: 1 x 10 (B), 6\" step  SLS cone reach: 1 x 10 (B), cone on 6\" step   Step-up to high knee: 1 x 10 (B), 6\" step    Manual Therapy Soft tissue mobilization: (B) gastroc, soleus, peroneals, tib anterior   Posterior talocrural joint mobs: Grade 3, 4 x 10\" (R)   Posterior talocrural joint mobs with distraction: Grade 3, 4 x 10\" (L)   Cross friction massage: (L) distal plantar fascia   Anterior fibular head joint mobs: Grade 3, 4 x 10\" (L)   Superior fibular joint mobs: Grade 3, 4 x 10\" (L) Superior glide to medial malleolus: Grade 3, 4 x 10\" - no change in pain with end-range passive DF  Posterior glide to medial malleolus: Grade 3, 4 x 10\" (L) - increased pain with end-range passive DF  Anterior glide to medial malleolus: Grade 3, 4 x 10\" (L) - mildly improved pain with end-range passive DF  Posterior talocrural joint mobs: Grade 3, 4  x 10\" (L)   Posterior talocrural joint mobs with distraction: Grade 3, 4 x 10\" (L)   Sustained posterior glide with passive ankle DF: 2 x 8 (L)   CKC ankle DF MWM: 2 x 10 with belt Posterior talocrural joint mobs: Grade 3, 4  x 10\" (L)   Posterior  talocrural joint mobs with distraction: Grade 3, 4 x 10\" (L)   Sustained posterior glide with passive ankle DF: 2 x 8 (L)  Soft tissue mobilization (supine): (L) peroneals, tib anterior, gastroc, solues  Trigger point release: (L) tib anterior  Posterior talocrural joint mobs: Grade 3, 4  x 10\" (L)   Posterior talocrural joint mobs with distraction: Grade 3, 4 x 10\" (L)   Sustained posterior glide with passive ankle DF: 2 x 8 (L)    Therapeutic Exercise Minutes 10 10 6 10   Neuro Re-Educ Minutes 10 5 15 5   Manual Therapy Minutes 20 25 19 15   Total Time Of Timed Procedures 40 40 40 30   Total Time Of Service-Based Procedures 0 0 0 0   Total Treatment Time 40 40 40 30   HEP    Access Code: [email or web address]  URL: [email or web address]  Date: 07/03/2025  Prepared by: Vane Wade    Exercises  - Gastroc Stretch on Step  - 1 x daily - 7 x weekly - 1 sets - 2 reps - 30\" hold  - Gastroc Stretch with Foot at Wall  - 1 x daily - 7 x weekly - 1 sets - 1 reps - 30\" hold  - Single Leg Stance  - 1 x daily - 7 x weekly - 1 sets - 2 reps - 30\" hold  - Single Leg Heel Raise with Counter Support  - 1 x daily - 7 x weekly - 2 sets - 10 reps  - Forward T  - 1 x daily - 7 x weekly - 3 sets - 10 reps        HEP  Access Code: [email or web address]  URL: [email or web address]  Date: 07/03/2025  Prepared by: Vane Wade    Exercises  - Gastroc Stretch on Step  - 1 x daily - 7 x weekly - 1 sets - 2 reps - 30\" hold  - Gastroc Stretch with Foot at Wall  - 1 x daily - 7 x weekly - 1 sets - 1 reps - 30\" hold  - Single Leg Stance  - 1 x daily - 7 x weekly - 1 sets - 2 reps - 30\" hold  - Single Leg Heel Raise with Counter Support  - 1 x daily - 7 x weekly - 2 sets - 10 reps  - Forward T  - 1 x daily - 7 x weekly - 3 sets - 10 reps    Charges     MT x 1, TE x 1    Post LEFS Score  Post LEFS Score: (Patient-Rptd) 92.5 % (7/3/2025  1:36 PM)    63.75 % improvement    Patient/Family/Caregiver was advised of these findings,  precautions, and treatment options and has agreed to actively participate in planning and for this course of care.    Thank you for your referral. If you have any questions, please contact me at Dept: [phone].    Sincerely,  Electronically signed by therapist: Vane Wade PT     Please co-sign or sign and return this letter via fax as soon as possible to [phone].   I certify the need for these services furnished under this plan of treatment and while under my care.    X___________________________________________________ Date____________________    Certification From: 7/3/2025  To:10/1/2025

## 2025-07-08 ENCOUNTER — APPOINTMENT (OUTPATIENT)
Dept: PHYSICAL THERAPY | Age: 55
End: 2025-07-08
Attending: ORTHOPAEDIC SURGERY
Payer: COMMERCIAL

## 2025-07-15 ENCOUNTER — APPOINTMENT (OUTPATIENT)
Dept: PHYSICAL THERAPY | Age: 55
End: 2025-07-15
Attending: ORTHOPAEDIC SURGERY
Payer: COMMERCIAL

## 2025-07-22 ENCOUNTER — APPOINTMENT (OUTPATIENT)
Dept: PHYSICAL THERAPY | Age: 55
End: 2025-07-22
Attending: ORTHOPAEDIC SURGERY
Payer: COMMERCIAL

## (undated) NOTE — ED AVS SNAPSHOT
Kenny Zarate   MRN: [id number]    Department:  [facility] Emergency Department   Date of Visit:  5/6/2019           Disclosure     Insurance plans vary and the physician(s) referred by the ER may not be covered by your plan.  Please contact your i tell this physician (or your personal doctor if your instructions are to return to your personal doctor) about any new or lasting problems. The primary care or specialist physician will see patients referred from the [facility] Emergency Department.  Davian Doss